# Patient Record
Sex: MALE | Race: WHITE | Employment: FULL TIME | URBAN - METROPOLITAN AREA
[De-identification: names, ages, dates, MRNs, and addresses within clinical notes are randomized per-mention and may not be internally consistent; named-entity substitution may affect disease eponyms.]

---

## 2017-05-11 ENCOUNTER — OFFICE VISIT (OUTPATIENT)
Dept: FAMILY MEDICINE | Facility: CLINIC | Age: 22
End: 2017-05-11
Payer: COMMERCIAL

## 2017-05-11 ENCOUNTER — RADIANT APPOINTMENT (OUTPATIENT)
Dept: GENERAL RADIOLOGY | Facility: CLINIC | Age: 22
End: 2017-05-11
Attending: FAMILY MEDICINE
Payer: COMMERCIAL

## 2017-05-11 VITALS
BODY MASS INDEX: 27.09 KG/M2 | WEIGHT: 211 LBS | DIASTOLIC BLOOD PRESSURE: 78 MMHG | TEMPERATURE: 98 F | HEART RATE: 46 BPM | OXYGEN SATURATION: 98 % | SYSTOLIC BLOOD PRESSURE: 128 MMHG

## 2017-05-11 DIAGNOSIS — S43.431A SUPERIOR GLENOID LABRUM LESION OF RIGHT SHOULDER, INITIAL ENCOUNTER: Primary | ICD-10-CM

## 2017-05-11 DIAGNOSIS — S43.431A SUPERIOR GLENOID LABRUM LESION OF RIGHT SHOULDER, INITIAL ENCOUNTER: ICD-10-CM

## 2017-05-11 PROCEDURE — 73030 X-RAY EXAM OF SHOULDER: CPT | Mod: RT

## 2017-05-11 PROCEDURE — 99213 OFFICE O/P EST LOW 20 MIN: CPT | Performed by: FAMILY MEDICINE

## 2017-05-11 RX ORDER — IBUPROFEN 200 MG
1000 TABLET ORAL DAILY
COMMUNITY
End: 2021-02-05

## 2017-05-11 NOTE — NURSING NOTE
"Chief Complaint   Patient presents with     Shoulder Injury     Right  -  Last Monday fell while playing soccer       Initial /78  Pulse (!) 46  Temp 98  F (36.7  C) (Oral)  Wt 211 lb (95.7 kg)  SpO2 98%  BMI 27.09 kg/m2 Estimated body mass index is 27.09 kg/(m^2) as calculated from the following:    Height as of 10/26/16: 6' 2\" (1.88 m).    Weight as of this encounter: 211 lb (95.7 kg).  Medication Reconciliation: complete   Herberth MICHELE      "

## 2017-05-11 NOTE — PROGRESS NOTES
SUBJECTIVE:                                                    Raj Smith is a 21 year old male who presents to clinic today for the following health issues:    Right Shoulder injury -  Last Monday fell while playing soccer  Trouble sleeping    Fell directly on the shoulder   Patient had a previous shoulder injury   He had a shoulder dislocation and it was immediately put back in place   This was 2 years ago    O: /78  Pulse (!) 46  Temp 98  F (36.7  C) (Oral)  Wt 211 lb (95.7 kg)  SpO2 98%  BMI 27.09 kg/m2    Patient is in [place]     He has pain over the bursa of the right shoulder   He can abduct with pain to 90 degrees and then he has so much pain he he has to stop   He has pain at the ac joint, but less so than the bursa     Xray of the shoulder appears normal     Joint is stable, but really pain in all motions       ICD-10-CM    1. Superior glenoid labrum lesion of right shoulder, initial encounter S43.431A XR Shoulder Right G/E 3 Views     I suspect patient has a traumatic bursitis of the right shoulder   Primary treatment is ice and ibuprofen and gradual increase of ROM as tolerated   If not better in two weeks then rtc.

## 2017-05-11 NOTE — LETTER
[facility]  [address] [place]  [address]  [phone]        May 12, 2017    Raj Smith  [address]  [facility] [address]        Dear Raj,    Your xray of your shoulder was normal     Results for orders placed or performed in visit on 05/11/17   XR Shoulder Right G/E 3 Views    Narrative    XR SHOULDER RT G/E 3 VW 5/11/2017 4:38 PM    COMPARISON: None.    HISTORY: Superior glenoid labral lesion.      Impression    IMPRESSION: No fracture or dislocation identified in the right  shoulder. No significant soft tissue swelling. Joint spaces are  preserved.    BLOSSOM MCKEON       If you have any questions please call the clinic at [phone].    Sincerely,    MD MORENITA Gallo

## 2017-05-11 NOTE — MR AVS SNAPSHOT
After Visit Summary   5/11/2017    Raj Smith    MRN: [id number]           Patient Information     Date Of Birth          1995        Visit Information        Provider Department      5/11/2017 3:40 PM Luis Martin MD [facility]        Today's Diagnoses     Superior glenoid labrum lesion of right shoulder, initial encounter    -  1      Care Instructions      What is bursitis?  A bursa is a fluid-filled sac that helps cushion the muscles, tendons, and bones around a joint. When a bursa becomes inflamed, it s called bursitis. Common symptoms of bursitis include pain, tenderness, and swelling that limits movement of the joint.  What causes bursitis?  Bursitis is most often caused by overuse of a joint. The repeated movements irritate the bursa and may cause it to swell. When that happens, other surrounding tissues may become inflamed or have less space to move. Bursitis is most common in large joints such as the knee, shoulder, and hip.       Nonsurgical treatment involves both rest and exercise.    How is bursitis treated?  To help reduce pain and swelling, your healthcare provider may recommend one or more of the following:     Rest gives the bursa time to heal. This means limiting activities that put stress on the joint.    Anti-inflammatory medications help reduce painful swelling. In some cases, this can include injections of cortisone or other steroid medicines into the bursa.    Splints and support bandages improve your comfort and allow the bursa to heal.    Physical therapy may be used to increase flexibility and strengthen muscles that support the joint.    Aspiration removes extra fluid from the bursa using a needle. This can help your healthcare provider find out what is causing your bursitis. For example, it might be an infection or overuse.     Surgery can be used to remove an inflamed or infected bursa. This is rarely needed.    3094-8049 [employer]. [address] [place] [address]. All rights reserved. This information is not intended as a substitute for professional medical care. Always follow your healthcare professional's instructions.        What is bursitis?  A bursa is a fluid-filled sac that helps cushion the muscles, tendons, and bones around a joint. When a bursa becomes inflamed, it s called bursitis. Common symptoms of bursitis include pain, tenderness, and swelling that limits movement of the joint.  What causes bursitis?  Bursitis is most often caused by overuse of a joint. The repeated movements irritate the bursa and may cause it to swell. When that happens, other surrounding tissues may become inflamed or have less space to move. Bursitis is most common in large joints such as the knee, shoulder, and hip.       Nonsurgical treatment involves both rest and exercise.    How is bursitis treated?  To help reduce pain and swelling, your healthcare provider may recommend one or more of the following:     Rest gives the bursa time to heal. This means limiting activities that put stress on the joint.    Anti-inflammatory medications help reduce painful swelling. In some cases, this can include injections of cortisone or other steroid medicines into the bursa.    Splints and support bandages improve your comfort and allow the bursa to heal.    Physical therapy may be used to increase flexibility and strengthen muscles that support the joint.    Aspiration removes extra fluid from the bursa using a needle. This can help your healthcare provider find out what is causing your bursitis. For example, it might be an infection or overuse.     Surgery can be used to remove an inflamed or infected bursa. This is rarely needed.    1442-0094 [employer]. [address] [place] [address]. All rights reserved. This information is not intended as a substitute for professional medical care. Always follow your  healthcare professional's instructions.        Bursitis  You have bursitis. This is an inflammation of the bursa. These are small, fluid-filled sacs that surround the larger joints of the body. The bursa help the muscles and tendons move smoothly over the joints.  Bursitis often happens in the shoulder. But it can also affect the elbows, hips, pelvis, knees, toes, and heels. Bursitis can be caused by injury, overuse of the joint, or infection of the bursa. Symptoms include pain and tenderness over a joint. Symptoms get worse with movement.  Bursitis is treated with an anti-inflammatory medicine and by resting the joint. More severe cases require injection of medicine directly into the bursa.    Home care    Rest the painful joint and protect it from movement. This will allow the inflammation to heal faster.    Apply an ice pack over the injured area for no more than 15 to 20 minutes. Do this every 3 to 6 hours for the first 24 to 48 hours. Keep using ice packs 3 to 4 times a day until the pain and swelling improves.     To make an ice pack, put ice cubes in a sealed plastic zip-lock bag. Wrap the bag in a clean, thin towel or cloth. Never put ice or an ice pack directly on the skin. As the ice melts, be careful to avoid getting any wrap or splint wet.    You may take over-the-counter pain medicine to treat pain and inflammation, unless another medicine was prescribed. Anti-inflammatory pain medicines may be more effective. Talk with your provider beforeusing these medicines if you have chronic liver or kidney disease, or ever had a stomach ulcer or GI (gastrointestinal) bleeding.    As your symptoms improve, slowly begin to move the joint. Do not overuse the joint. This may cause the symptoms to flare up again.  When to seek medical advice  Call your healthcare provider right away if any of these occur:    Redness over the painful area    Increasing pain or swelling at the joint    Fever of 100.4 F (38 C) or above  "lasting for 24 to 48 hours    6176-6585 [employer]. [address] [place] [address], [place] [address]. All rights reserved. This information is not intended as a substitute for professional medical care. Always follow your healthcare professional's instructions.              Follow-ups after your visit        Who to contact     If you have questions or need follow up information about today's clinic visit or your schedule please contact [facility] directly at [phone].  Normal or non-critical lab and imaging results will be communicated to you by MyChart, letter or phone within 4 business days after the clinic has received the results. If you do not hear from us within 7 days, please contact the clinic through [employer] or phone. If you have a critical or abnormal lab result, we will notify you by phone as soon as possible.  Submit refill requests through [employer] or call your pharmacy and they will forward the refill request to us. Please allow 3 business days for your refill to be completed.          Additional Information About Your Visit        [facility] Information     [employer] lets you send messages to your doctor, view your test results, renew your prescriptions, schedule appointments and more. To sign up, go to [email or web address] . Click on \"Log in\" on the left side of the screen, which will take you to the Welcome page. Then click on \"Sign up Now\" on the right side of the page.     You will be asked to enter the access code listed below, as well as some personal information. Please follow the directions to create your username and password.     Your access code is: [email or web address]  Expires: 2017  4:49 PM     Your access code will  in 90 days. If you need help or a new code, please call your [facility] or [phone].        Care EveryWhere ID     This is your Care EveryWhere ID. This could be used by other organizations to access your [facility] medical " records  [id number]        Your Vitals Were     Pulse Temperature Pulse Oximetry BMI (Body Mass Index)          46 98  F (36.7  C) (Oral) 98% 27.09 kg/m2         Blood Pressure from Last 3 Encounters:   05/11/17 128/78   10/26/16 120/70   09/27/16 116/74    Weight from Last 3 Encounters:   05/11/17 211 lb (95.7 kg)   10/26/16 209 lb (94.8 kg)   09/27/16 201 lb 2 oz (91.2 kg)                 Today's Medication Changes          These changes are accurate as of: 5/11/17  4:53 PM.  If you have any questions, ask your nurse or doctor.               Stop taking these medicines if you haven't already. Please contact your care team if you have questions.     amoxicillin-clavulanate 875-125 MG per tablet   Commonly known as:  AUGMENTIN   Stopped by:  Luis Martin MD           fluticasone 50 MCG/ACT spray   Commonly known as:  FLONASE   Stopped by:  Luis Martin MD           omeprazole 20 MG CR capsule   Commonly known as:  priLOSEC   Stopped by:  Luis Martin MD                    Primary Care Provider    None Specified       No primary provider on file.        Thank you!     Thank you for choosing [facility]  for your care. Our goal is always to provide you with excellent care. Hearing back from our patients is one way we can continue to improve our services. Please take a few minutes to complete the written survey that you may receive in the mail after your visit with us. Thank you!             Your Updated Medication List - Protect others around you: Learn how to safely use, store and throw away your medicines at www.disposemymeds.org.          This list is accurate as of: 5/11/17  4:53 PM.  Always use your most recent med list.                   Brand Name Dispense Instructions for use    ibuprofen 200 MG tablet    ADVIL/MOTRIN     Take 1,000 mg by mouth daily

## 2017-05-11 NOTE — PATIENT INSTRUCTIONS
What is bursitis?  A bursa is a fluid-filled sac that helps cushion the muscles, tendons, and bones around a joint. When a bursa becomes inflamed, it s called bursitis. Common symptoms of bursitis include pain, tenderness, and swelling that limits movement of the joint.  What causes bursitis?  Bursitis is most often caused by overuse of a joint. The repeated movements irritate the bursa and may cause it to swell. When that happens, other surrounding tissues may become inflamed or have less space to move. Bursitis is most common in large joints such as the knee, shoulder, and hip.       Nonsurgical treatment involves both rest and exercise.    How is bursitis treated?  To help reduce pain and swelling, your healthcare provider may recommend one or more of the following:     Rest gives the bursa time to heal. This means limiting activities that put stress on the joint.    Anti-inflammatory medications help reduce painful swelling. In some cases, this can include injections of cortisone or other steroid medicines into the bursa.    Splints and support bandages improve your comfort and allow the bursa to heal.    Physical therapy may be used to increase flexibility and strengthen muscles that support the joint.    Aspiration removes extra fluid from the bursa using a needle. This can help your healthcare provider find out what is causing your bursitis. For example, it might be an infection or overuse.     Surgery can be used to remove an inflamed or infected bursa. This is rarely needed.    4781-6314 [employer]. [address] [place] [address], [place] [address]. All rights reserved. This information is not intended as a substitute for professional medical care. Always follow your healthcare professional's instructions.        What is bursitis?  A bursa is a fluid-filled sac that helps cushion the muscles, tendons, and bones around a joint. When a bursa becomes inflamed, it s called bursitis. Common symptoms of  bursitis include pain, tenderness, and swelling that limits movement of the joint.  What causes bursitis?  Bursitis is most often caused by overuse of a joint. The repeated movements irritate the bursa and may cause it to swell. When that happens, other surrounding tissues may become inflamed or have less space to move. Bursitis is most common in large joints such as the knee, shoulder, and hip.       Nonsurgical treatment involves both rest and exercise.    How is bursitis treated?  To help reduce pain and swelling, your healthcare provider may recommend one or more of the following:     Rest gives the bursa time to heal. This means limiting activities that put stress on the joint.    Anti-inflammatory medications help reduce painful swelling. In some cases, this can include injections of cortisone or other steroid medicines into the bursa.    Splints and support bandages improve your comfort and allow the bursa to heal.    Physical therapy may be used to increase flexibility and strengthen muscles that support the joint.    Aspiration removes extra fluid from the bursa using a needle. This can help your healthcare provider find out what is causing your bursitis. For example, it might be an infection or overuse.     Surgery can be used to remove an inflamed or infected bursa. This is rarely needed.    6800-7223 [employer]. [address] [place] [address], [place] [address]. All rights reserved. This information is not intended as a substitute for professional medical care. Always follow your healthcare professional's instructions.        Bursitis  You have bursitis. This is an inflammation of the bursa. These are small, fluid-filled sacs that surround the larger joints of the body. The bursa help the muscles and tendons move smoothly over the joints.  Bursitis often happens in the shoulder. But it can also affect the elbows, hips, pelvis, knees, toes, and heels. Bursitis can be caused by injury, overuse of  the joint, or infection of the bursa. Symptoms include pain and tenderness over a joint. Symptoms get worse with movement.  Bursitis is treated with an anti-inflammatory medicine and by resting the joint. More severe cases require injection of medicine directly into the bursa.    Home care    Rest the painful joint and protect it from movement. This will allow the inflammation to heal faster.    Apply an ice pack over the injured area for no more than 15 to 20 minutes. Do this every 3 to 6 hours for the first 24 to 48 hours. Keep using ice packs 3 to 4 times a day until the pain and swelling improves.     To make an ice pack, put ice cubes in a sealed plastic zip-lock bag. Wrap the bag in a clean, thin towel or cloth. Never put ice or an ice pack directly on the skin. As the ice melts, be careful to avoid getting any wrap or splint wet.    You may take over-the-counter pain medicine to treat pain and inflammation, unless another medicine was prescribed. Anti-inflammatory pain medicines may be more effective. Talk with your provider beforeusing these medicines if you have chronic liver or kidney disease, or ever had a stomach ulcer or GI (gastrointestinal) bleeding.    As your symptoms improve, slowly begin to move the joint. Do not overuse the joint. This may cause the symptoms to flare up again.  When to seek medical advice  Call your healthcare provider right away if any of these occur:    Redness over the painful area    Increasing pain or swelling at the joint    Fever of 100.4 F (38 C) or above lasting for 24 to 48 hours    9637-6902 [employer]. [address] [place] [address], [place] [address]. All rights reserved. This information is not intended as a substitute for professional medical care. Always follow your healthcare professional's instructions.

## 2018-10-03 ENCOUNTER — OFFICE VISIT (OUTPATIENT)
Dept: FAMILY MEDICINE | Facility: CLINIC | Age: 23
End: 2018-10-03
Payer: COMMERCIAL

## 2018-10-03 VITALS — DIASTOLIC BLOOD PRESSURE: 72 MMHG | TEMPERATURE: 98.1 F | SYSTOLIC BLOOD PRESSURE: 115 MMHG

## 2018-10-03 DIAGNOSIS — Z71.84 TRAVEL ADVICE ENCOUNTER: Primary | ICD-10-CM

## 2018-10-03 DIAGNOSIS — Z23 NEED FOR VACCINATION: ICD-10-CM

## 2018-10-03 PROCEDURE — 90738 INACTIVATED JE VACC IM: CPT | Mod: GA | Performed by: NURSE PRACTITIONER

## 2018-10-03 PROCEDURE — 90746 HEPB VACCINE 3 DOSE ADULT IM: CPT | Mod: GA | Performed by: NURSE PRACTITIONER

## 2018-10-03 PROCEDURE — 90675 RABIES VACCINE IM: CPT | Mod: GA | Performed by: NURSE PRACTITIONER

## 2018-10-03 PROCEDURE — 90472 IMMUNIZATION ADMIN EACH ADD: CPT | Mod: GA | Performed by: NURSE PRACTITIONER

## 2018-10-03 PROCEDURE — 99402 PREV MED CNSL INDIV APPRX 30: CPT | Mod: 25 | Performed by: NURSE PRACTITIONER

## 2018-10-03 PROCEDURE — 90471 IMMUNIZATION ADMIN: CPT | Mod: GA | Performed by: NURSE PRACTITIONER

## 2018-10-03 PROCEDURE — 90632 HEPA VACCINE ADULT IM: CPT | Mod: GA | Performed by: NURSE PRACTITIONER

## 2018-10-03 RX ORDER — AZITHROMYCIN 500 MG/1
500 TABLET, FILM COATED ORAL DAILY
Qty: 6 TABLET | Refills: 0 | Status: SHIPPED | OUTPATIENT
Start: 2018-10-03 | End: 2018-10-06

## 2018-10-03 RX ORDER — ATOVAQUONE AND PROGUANIL HYDROCHLORIDE 250; 100 MG/1; MG/1
1 TABLET, FILM COATED ORAL DAILY
Qty: 50 TABLET | Refills: 0 | Status: SHIPPED | OUTPATIENT
Start: 2018-10-03 | End: 2021-02-05

## 2018-10-03 NOTE — MR AVS SNAPSHOT
After Visit Summary   10/3/2018    Raj Smith    MRN: [id number]           Patient Information     Date Of Birth          1995        Visit Information        Provider Department      10/3/2018 4:00 PM Eliana Celaya APRN CNP [facility]        Today's Diagnoses     Travel advice encounter    -  1    Need for vaccination          Care Instructions    Today October 3, 2018 you received the    Rabies Vaccine - Please return on 10/10/18 for your 2nd dose and 10/24/2018 for your 3rd and final dose.    Hepatitis A Vaccine -  Hepatitis B Vaccine -    Japanese Encephalitis (JE) Vaccine - Please return on 10/10/2018  for your 2nd and final dose.    10/10/2018  JE  Rabies  Typhoid  Flu    10/24*2018  Rabies  Tdap      .    These appointments can be made as a NURSE ONLY visit.    **It is very important for the vaccinations to be given on the scheduled day(s), this helps ensure you receive the full effectiveness of the vaccine.**    Please call [facility] with any questions [phone]    Thank you for visiting [facility]            Follow-ups after your visit        Future tests that were ordered for you today     Open Standing Orders        Priority Remaining Interval Expires Ordered    C IMOVAX RABIES VACCINE, IM Routine 2/3  10/3/2019 10/3/2018          Open Future Orders        Priority Expected Expires Ordered    TYPHOID VACCINE, IM Routine 10/10/2018 10/3/2019 10/3/2018    FLU VAC PRESRV FREE QUAD SPLIT VIR, IM (3+ YRS) Routine 10/10/2018 10/3/2019 10/3/2018    TDAP, IM (10 - 64 YRS) - Adacel Routine 10/24/2018 10/3/2019 10/3/2018            Who to contact     If you have questions or need follow up information about today's clinic visit or your schedule please contact [facility] directly at [phone].  Normal or non-critical lab and imaging results will be communicated to you by MyChart, letter or phone within 4 business  "days after the clinic has received the results. If you do not hear from us within 7 days, please contact the clinic through SimpleSite or phone. If you have a critical or abnormal lab result, we will notify you by phone as soon as possible.  Submit refill requests through SimpleSite or call your pharmacy and they will forward the refill request to us. Please allow 3 business days for your refill to be completed.          Additional Information About Your Visit        SimpleSite Information     SimpleSite lets you send messages to your doctor, view your test results, renew your prescriptions, schedule appointments and more. To sign up, go to [email or web address] . Click on \"Log in\" on the left side of the screen, which will take you to the Welcome page. Then click on \"Sign up Now\" on the right side of the page.     You will be asked to enter the access code listed below, as well as some personal information. Please follow the directions to create your username and password.     Your access code is: [email or web address]  Expires: 2019  4:55 PM     Your access code will  in 90 days. If you need help or a new code, please call your [facility] clinic or [phone].        Care EveryWhere ID     This is your Care EveryWhere ID. This could be used by other organizations to access your [facility] medical records  [id number]        Your Vitals Were     Temperature                   98.1  F (36.7  C) (Oral)            Blood Pressure from Last 3 Encounters:   10/03/18 115/72   17 128/78   10/26/16 120/70    Weight from Last 3 Encounters:   17 211 lb (95.7 kg)   10/26/16 209 lb (94.8 kg)   16 201 lb 2 oz (91.2 kg)              We Performed the Following     HEPA VACCINE ADULT IM     HEPATITIS B VACCINE,ADULT,IM     Greenlandic ENCEPHALITIS IM 16 YO +          Today's Medication Changes          These changes are accurate as of 10/3/18  4:55 PM.  If you have any questions, ask your nurse or doctor.               Start " taking these medicines.        Dose/Directions    atovaquone-proguanil 250-100 MG per tablet   Commonly known as:  MALARONE   Used for:  Travel advice encounter   Started by:  Eliana Celaya APRN CNP        Dose:  1 tablet   Take 1 tablet by mouth daily Start 2 days before exposure to Malaria and continue daily till  7 days after exposure.   Quantity:  50 tablet   Refills:  0       azithromycin 500 MG tablet   Commonly known as:  ZITHROMAX   Used for:  Travel advice encounter   Started by:  Eliana Celaya APRN CNP        Dose:  500 mg   Take 1 tablet (500 mg) by mouth daily for 3 doses Take 1 tablet a day for up to 3 days for severe diarrhea   Quantity:  6 tablet   Refills:  0            Where to get your medicines      These medications were sent to [facility] [address] [place] [address] Rd.  [address] [facility] [address]     Phone:  [phone]     atovaquone-proguanil 250-100 MG per tablet    azithromycin 500 MG tablet                Primary Care Provider Office Phone # Fax #    [facility] [phone]       [address]  [facility] [address]        Equal Access to Services     [facility] Center[facility]OLINA AH: Hadii aad ku hadasho Soomaali, waaxda luqadaha, qaybta kaalmada adeegyada, waxay idiin hayjo annn jolene parada. So [facility] [phone].    ATENCIÓN: Si habla español, tiene a jean disposición servicios gratuitos de asistencia lingüística. Llame al [phone].    We comply with applicable federal civil rights laws and [place] laws. We do not discriminate on the basis of race, color, national origin, age, disability, sex, sexual orientation, or gender identity.            Thank you!     Thank you for choosing [facility]  for your care. Our goal is always to provide you with excellent care. Hearing back from our patients is one way we can continue to improve our services. Please take a few minutes to complete the written  survey that you may receive in the mail after your visit with us. Thank you!             Your Updated Medication List - Protect others around you: Learn how to safely use, store and throw away your medicines at www.disposemymeds.org.          This list is accurate as of 10/3/18  4:55 PM.  Always use your most recent med list.                   Brand Name Dispense Instructions for use Diagnosis    atovaquone-proguanil 250-100 MG per tablet    MALARONE    50 tablet    Take 1 tablet by mouth daily Start 2 days before exposure to Malaria and continue daily till  7 days after exposure.    Travel advice encounter       azithromycin 500 MG tablet    ZITHROMAX    6 tablet    Take 1 tablet (500 mg) by mouth daily for 3 doses Take 1 tablet a day for up to 3 days for severe diarrhea    Travel advice encounter       ibuprofen 200 MG tablet    ADVIL/MOTRIN     Take 1,000 mg by mouth daily

## 2018-10-03 NOTE — PATIENT INSTRUCTIONS
Today October 3, 2018 you received the    Rabies Vaccine - Please return on 10/10/18 for your 2nd dose and 10/24/2018 for your 3rd and final dose.    Hepatitis A Vaccine -  Hepatitis B Vaccine -    Japanese Encephalitis (JE) Vaccine - Please return on 10/10/2018  for your 2nd and final dose.    10/10/2018  JE  Rabies  Typhoid  Flu    10/24*2018  Rabies  Tdap      .    These appointments can be made as a NURSE ONLY visit.    **It is very important for the vaccinations to be given on the scheduled day(s), this helps ensure you receive the full effectiveness of the vaccine.**    Please call [facility] with any questions [phone]    Thank you for visiting [facility]

## 2018-10-03 NOTE — PROGRESS NOTES
Nurse Note      Itinerary:  [place], [place], [place], [place], [place], [place], [place], [place]      Departure Date: 10/29/2018      Return Date: 03/01/2019      Length of Trip 4 months      Reason for Travel: pleasure           Urban or rural: both      Accommodations: Hotel    Hostel    Family home        IMMUNIZATION HISTORY  Have you received any immunizations within the past 4 weeks?  No  Have you ever fainted from having your blood drawn or from an injection?  No  Have you ever had a fever reaction to vaccination?  No  Have you ever had any bad reaction or side effect from any vaccination?  no  Have you ever had hepatitis A or B vaccine?  Yes  Do you live (or work closely) with anyone who has AIDS, an AIDS-like condition, any other immune disorder or who is on chemotherapy for cancer?  No  Do you have a family history of immunodeficiency?  No  Have you received any injection of immune globulin or any blood products during the past 12 months?  No    Patient roomed by SAURABH Ely  Raj Smith is a 23 year old male seen today with spouse for counsultation for international travel to multiple (above) countries.  Patient will be departing in  3 week(s) and staying for   4 month(s) and  traveling with spouse.      Patient itinerary :  will be in the [place] ( 3-4 ) [place] or [place] ( [facility] t[facility]  [facility])    region and with an open itinerary which presents risk for Malaria, Dengue Fever, Chikungungya, Zika, food borne illnesses and Typhoid. exposure.      Patient's activities will include hiking, scuba diving and snorkeling.    Patient[place] country of birth is [place]      Special medical concerns: none  Pre-travel questionnaire was completed by patient and reviewed by provider.     Vitals: /72  Temp 98.1  F (36.7  C) (Oral)  BMI= There is no height or weight on file to calculate BMI.    EXAM:  General:  Well-nourished, well-developed in no acute distress.  Appears to be  stated age, interacts appropriately and expresses understanding of information given to patient.    Current Outpatient Prescriptions   Medication Sig Dispense Refill     atovaquone-proguanil (MALARONE) 250-100 MG per tablet Take 1 tablet by mouth daily Start 2 days before exposure to Malaria and continue daily till  7 days after exposure. 50 tablet 0     ibuprofen (ADVIL/MOTRIN) 200 MG tablet Take 1,000 mg by mouth daily       There is no problem list on file for this patient.    No Known Allergies      Immunizations discussed include:   Hepatitis A:  Ordered/given today, risks, benefits and side effects reviewed  Hepatitis B: Ordered/given today, risks, benefits and side effects reviewed  Influenza: future order placed  Typhoid: future order  Rabies: Ordered/given today, risks, benefits and side effects reviewed  Yellow Fever: Not indicated  Bermudian Encephalitis: Ordered/given today - side effects, precautions, allergies, risks discussed. Patient expressed understanding.  Meningococcus: Up to date  Tetanus/Diphtheria: future order placed  Measles/Mumps/Rubella: Up to date  Cholera: Not needed  Polio: Up to date  Pneumococcal: Under age of 65  Varicella: Immune by disease history per patient report  Zostavax:  Not indicated  Shingrix: Not indicated  HPV:  Not indicated  TB:  Low risk    Altitude Exposure on this trip: no   Past tolerance to Altitude: na    ASSESSMENT/PLAN:    ICD-10-CM    1. Travel advice encounter Z71.89 HEPA VACCINE ADULT IM     TYPHOID VACCINE, IM     FLU VAC PRESRV FREE QUAD SPLIT VIR, IM (3+ YRS)     HEPATITIS B VACCINE,ADULT,IM     C IMOVAX RABIES VACCINE, IM     TDAP, IM (10 - 64 YRS) - Adacel     JAPANESE ENCEPHALITIS IM 18 YO +     C IMOVAX RABIES VACCINE, IM     atovaquone-proguanil (MALARONE) 250-100 MG per tablet     azithromycin (ZITHROMAX) 500 MG tablet     ADMIN 1st VACCINE     EA ADD'L VACCINE   2. Need for vaccination Z23 HEPA VACCINE ADULT IM     TYPHOID VACCINE, IM     FLU VAC  PRESRV FREE QUAD SPLIT VIR, IM (3+ YRS)     HEPATITIS B VACCINE,ADULT,IM     C IMOVAX RABIES VACCINE, IM     TDAP, IM (10 - 64 YRS) - Adacel     C IMOVAX RABIES VACCINE, IM     ADMIN 1st VACCINE     EA ADD'L VACCINE     I have reviewed general recommendations for safe travel   including: food/water precautions, insect precautions, safer sex   practices given high prevalence of Zika, HIV and other STDs,   roadway safety. Educational materials and [facility] report provided.    Malaraia prophylaxis recommended: Malarone  Symptomatic treatment for traveler's diarrhea: azithromycin  Altitude illness prevention and treatment: none      Evacuation insurance advised and resources were provided to patient.    Total visit time 30 minutes  with over 50% of time spent counseling patient as detailed above.    Eliana Celaya CNP

## 2018-10-10 ENCOUNTER — ALLIED HEALTH/NURSE VISIT (OUTPATIENT)
Dept: NURSING | Facility: CLINIC | Age: 23
End: 2018-10-10
Payer: COMMERCIAL

## 2018-10-10 VITALS — TEMPERATURE: 98.8 F

## 2018-10-10 DIAGNOSIS — Z23 NEED FOR VACCINATION: ICD-10-CM

## 2018-10-10 DIAGNOSIS — Z71.84 TRAVEL ADVICE ENCOUNTER: ICD-10-CM

## 2018-10-10 PROCEDURE — 90738 INACTIVATED JE VACC IM: CPT | Mod: GA

## 2018-10-10 PROCEDURE — 90691 TYPHOID VACCINE IM: CPT | Mod: GA

## 2018-10-10 PROCEDURE — 90686 IIV4 VACC NO PRSV 0.5 ML IM: CPT | Mod: GA

## 2018-10-10 PROCEDURE — 90675 RABIES VACCINE IM: CPT | Mod: GA

## 2018-10-10 PROCEDURE — 90472 IMMUNIZATION ADMIN EACH ADD: CPT | Mod: GA

## 2018-10-10 PROCEDURE — 90471 IMMUNIZATION ADMIN: CPT | Mod: GA

## 2018-10-10 PROCEDURE — 99207 ZZC NO CHARGE NURSE ONLY: CPT

## 2018-10-24 ENCOUNTER — ALLIED HEALTH/NURSE VISIT (OUTPATIENT)
Dept: NURSING | Facility: CLINIC | Age: 23
End: 2018-10-24
Payer: COMMERCIAL

## 2018-10-24 DIAGNOSIS — Z23 NEED FOR VACCINATION: ICD-10-CM

## 2018-10-24 DIAGNOSIS — Z71.84 TRAVEL ADVICE ENCOUNTER: ICD-10-CM

## 2018-10-24 PROCEDURE — 90675 RABIES VACCINE IM: CPT | Mod: GA

## 2018-10-24 PROCEDURE — 99207 ZZC NO CHARGE NURSE ONLY: CPT

## 2018-10-24 PROCEDURE — 90715 TDAP VACCINE 7 YRS/> IM: CPT | Mod: GA

## 2018-10-24 PROCEDURE — 90471 IMMUNIZATION ADMIN: CPT | Mod: GA

## 2018-10-24 PROCEDURE — 90472 IMMUNIZATION ADMIN EACH ADD: CPT | Mod: GA

## 2021-02-05 ENCOUNTER — OFFICE VISIT (OUTPATIENT)
Dept: FAMILY MEDICINE | Facility: CLINIC | Age: 26
End: 2021-02-05
Payer: COMMERCIAL

## 2021-02-05 VITALS
BODY MASS INDEX: 25.41 KG/M2 | WEIGHT: 198 LBS | SYSTOLIC BLOOD PRESSURE: 118 MMHG | HEART RATE: 60 BPM | DIASTOLIC BLOOD PRESSURE: 78 MMHG | HEIGHT: 74 IN

## 2021-02-05 DIAGNOSIS — R12 HEARTBURN: ICD-10-CM

## 2021-02-05 DIAGNOSIS — S09.90XD INJURY OF HEAD, SUBSEQUENT ENCOUNTER: ICD-10-CM

## 2021-02-05 DIAGNOSIS — S89.92XD INJURY OF LEFT LOWER EXTREMITY, SUBSEQUENT ENCOUNTER: ICD-10-CM

## 2021-02-05 DIAGNOSIS — Z00.00 ROUTINE GENERAL MEDICAL EXAMINATION AT A HEALTH CARE FACILITY: Primary | ICD-10-CM

## 2021-02-05 PROCEDURE — 99213 OFFICE O/P EST LOW 20 MIN: CPT | Mod: 25 | Performed by: NURSE PRACTITIONER

## 2021-02-05 PROCEDURE — 99395 PREV VISIT EST AGE 18-39: CPT | Mod: 25 | Performed by: NURSE PRACTITIONER

## 2021-02-05 PROCEDURE — 90686 IIV4 VACC NO PRSV 0.5 ML IM: CPT | Performed by: NURSE PRACTITIONER

## 2021-02-05 PROCEDURE — 90471 IMMUNIZATION ADMIN: CPT | Performed by: NURSE PRACTITIONER

## 2021-02-05 RX ORDER — MAGNESIUM HYDROXIDE/ALUMINUM HYDROXICE/SIMETHICONE 120; 1200; 1200 MG/30ML; MG/30ML; MG/30ML
30 SUSPENSION ORAL EVERY 4 HOURS PRN
COMMUNITY
End: 2022-03-07 | Stop reason: ALTCHOICE

## 2021-02-05 RX ORDER — DIPHENHYDRAMINE HCL 25 MG
25 TABLET ORAL EVERY 6 HOURS PRN
COMMUNITY
End: 2023-02-16 | Stop reason: SINTOL

## 2021-02-05 ASSESSMENT — ENCOUNTER SYMPTOMS
ABDOMINAL PAIN: 0
SHORTNESS OF BREATH: 0
CHILLS: 0
CONSTIPATION: 0
DYSURIA: 0
HEMATURIA: 0
NAUSEA: 0
PALPITATIONS: 0
SORE THROAT: 0
DIZZINESS: 0
ARTHRALGIAS: 0
HEARTBURN: 0
HEADACHES: 0
MYALGIAS: 0
FEVER: 0
COUGH: 0
FREQUENCY: 0
JOINT SWELLING: 0
PARESTHESIAS: 0
EYE PAIN: 0
NERVOUS/ANXIOUS: 1
HEMATOCHEZIA: 0
WEAKNESS: 0
DIARRHEA: 0

## 2021-02-05 ASSESSMENT — MIFFLIN-ST. JEOR: SCORE: 1946.25

## 2021-02-05 NOTE — PROGRESS NOTES
SUBJECTIVE:   CC: Raj Smith is an 25 year old male who presents for preventative health visit.       Patient has been advised of split billing requirements and indicates understanding: Yes  Healthy Habits:     Getting at least 3 servings of Calcium per day:  Yes    Bi-annual eye exam:  NO    Dental care twice a year:  Yes    Sleep apnea or symptoms of sleep apnea:  None    Diet:  Other    Frequency of exercise:  2-3 days/week    Duration of exercise:  30-45 minutes    Taking medications regularly:  Yes    Medication side effects:  None    PHQ-2 Total Score: 2    Additional concerns today:  No    He states he will be doing biometric screening through work in March. His wife is an RN     Last week he was in a ski accident. He states he twisted his left leg during this injury and hit his head. He was evaluated in the ED and imaging was performed. He states his symptoms are improving however he continues to have some pain to his lateral and medial left upper leg with twisting motions, he Is able to walk okay. Neck pain has resolved. He had fatigue for a few days afterwards. He denies loss of consciousness. He took tylenol and ibuprofen in the beginning. He states screens cause exhaustion, he has had to take frequent breaks from work the first few days. He is working from home. Denies prior concussion. He slept 19 hours the day after the injury. He reports ringing of his right ear a few days ago. Denies vomiting. He reports chronic runny nose in the morning.     He believes he has GERD. He gets heartburn and burning in his throat from chocolate, sugar, alcohol and coffee and it is worse in the evenings. 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 He is taking omeprazole daily but is unsure of the dose. He also takes Mylanta at night. He admits to eating fairly healthy. He also reports hemorrhoids in the past.      He states he is involved in a soccer league and walks regularly.     He admits to being a little down due to the pandemic and not being able to see family/friends. He states he has been doing meditation through work. Declines need for therapist and medications at this time.     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 He states his brother has these too.     ED/UC Followup:    Facility:  [facility],   Date of visit: 01/29/2021  Reason for visit: Skiing accident 01/29  Current Status:        Today's PHQ-2 Score:   PHQ-2 ( 1999 Pfizer) 2/5/2021   Q1: Little interest or pleasure in doing things 1   Q2: Feeling down, depressed or hopeless 1   PHQ-2 Score 2   Q1: Little interest or pleasure in doing things Several days   Q2: Feeling down, depressed or hopeless Several days   PHQ-2 Score 2       Abuse: Current or Past(Physical, Sexual or Emotional)- No  Do you feel safe in your environment? Yes        Social History     Tobacco Use     Smoking status: Never Smoker     Smokeless tobacco: Never Used   Substance Use Topics     Alcohol use: Yes     Comment: 1 beer a day 5-7 drinks per week       Alcohol Use 2/5/2021   Prescreen: >3 drinks/day or >7 drinks/week? Yes   AUDIT SCORE  4     Last PSA: No results found for: PSA    Reviewed orders with patient. Reviewed health maintenance and updated orders accordingly - Yes      Reviewed and updated as needed this visit by clinical staff  Tobacco  Allergies  Meds  Problems  Med Hx  Surg Hx  Fam Hx  Soc Hx          Reviewed and updated as needed this visit by Provider     Problems              Review of Systems   Constitutional: Negative for chills and fever.   HENT: Negative for congestion, ear pain, hearing loss and sore throat.    Eyes: Negative for pain and visual disturbance.   Respiratory: Negative for cough and shortness of breath.    Cardiovascular: Negative for chest pain, palpitations and peripheral edema.  "  Gastrointestinal: Negative for abdominal pain, constipation, diarrhea, heartburn, hematochezia and nausea.   Genitourinary: Negative for discharge, dysuria, frequency, genital sores, hematuria, impotence and urgency.   Musculoskeletal: Negative for arthralgias, joint swelling and myalgias.   Skin: Negative for rash.   Neurological: Negative for dizziness, weakness, headaches and paresthesias.   Psychiatric/Behavioral: Negative for mood changes. The patient is nervous/anxious.        OBJECTIVE:   /78 (Cuff Size: Adult Regular)   Pulse 60   Ht 1.885 m (6' 2.21\")   Wt 89.8 kg (198 lb)   BMI 25.28 kg/m      Physical Exam  GENERAL: healthy, alert and no distress  EYES: Eyes grossly normal to inspection, PERRL and conjunctivae and sclerae normal  HENT: nose and mouth without ulcers or lesions. Clear fluid noted to right TM.   NECK: no adenopathy, no asymmetry, masses, or scars and thyroid normal to palpation. Full ROM of head/neck, tenderness noted to bilat upper trapezius muscle   RESP: lungs clear to auscultation - no rales, rhonchi or wheezes  CV: regular rate and rhythm, normal S1 S2, no S3 or S4, no murmur, click or rub, no peripheral edema and peripheral pulses strong  ABDOMEN: soft, nontender, no hepatosplenomegaly, no masses and bowel sounds normal  MS: gait steady, negative straight raise leg test. Decreased ROM with adduction and abduction of left hip  SKIN: no suspicious lesions or rashes. Lipoma about the size of dime noted to RUQ area   NEURO: Normal strength and tone, mentation intact and speech normal  PSYCH: mentation appears normal, affect normal/bright      ASSESSMENT/PLAN:   1. Routine general medical examination at a health care facility    2. Heartburn  EGD ordered due to chronicity of symptoms. Pt to continue to take omeprazole daily and avoid food triggers   - GASTROENTEROLOGY ADULT REF PROCEDURE ONLY; Future    3. Injury of head, subsequent encounter  Encouraged pt to avoid screen time " "and encouraged rest due to recent head injury. Pt had brain imaging last week and symptoms are improving per report. No red flag symptoms at this time. Handout provided regarding concussion.     4. Leg injury  Encouraged rest, elevation, alternating tylenol and ibuprofen as needed for pain, and ice as needed. Discussed physical therapy however pt plans to do stretches at home. If pain persists may need to obtain MRI.       COUNSELING:   Reviewed preventive health counseling, as reflected in patient instructions    Estimated body mass index is 25.28 kg/m  as calculated from the following:    Height as of this encounter: 1.885 m (6' 2.21\").    Weight as of this encounter: 89.8 kg (198 lb).     Weight management plan: Discussed healthy diet and exercise guidelines    He reports that he has never smoked. He has never used smokeless tobacco.      Counseling Resources:  ATP IV Guidelines  Pooled Cohorts Equation Calculator  FRAX Risk Assessment  ICSI Preventive Guidelines  Dietary Guidelines for Americans, 2010  USDA's MyPlate  ASA Prophylaxis  Lung CA Screening    Nusrat Harding NP  [facility] [place]  "

## 2021-02-05 NOTE — PATIENT INSTRUCTIONS
Preventive Health Recommendations  Male Ages 26 - 39    Yearly exam:             See your health care provider every year in order to  o   Review health changes.   o   Discuss preventive care.    o   Review your medicines if your doctor has prescribed any.    You should be tested each year for STDs (sexually transmitted diseases), if you re at risk.     After age 35, talk to your provider about cholesterol testing. If you are at risk for heart disease, have your cholesterol tested at least every 5 years.     If you are at risk for diabetes, you should have a diabetes test (fasting glucose).  Shots: Get a flu shot each year. Get a tetanus shot every 10 years.     Nutrition:    Eat at least 5 servings of fruits and vegetables daily.     Eat whole-grain bread, whole-wheat pasta and brown rice instead of white grains and rice.     Get adequate Calcium and Vitamin D.     Lifestyle    Exercise for at least 150 minutes a week (30 minutes a day, 5 days a week). This will help you control your weight and prevent disease.     Limit alcohol to one drink per day.     No smoking.     Wear sunscreen to prevent skin cancer.     See your dentist every six months for an exam and cleaning.     Patient Education     Concussion    A concussion is a type of brain injury. It can be caused by a direct hit or blow to the head, neck, face, or body. The force of the blow makes the head and brain shake quickly back and forth. In some cases you may lose consciousness. Depending on the severity of the blow, it will take from a few hours up to a few days to get better. Sometimes symptoms may last a few months or longer. This is called post-concussion syndrome.  At first, you may have a headache, nausea, vomiting, or dizziness. You may also have problems concentrating or remembering things. This is normal.  Symptoms should get better as the hours and days go by. Symptoms that get worse could be a sign of a more serious brain injury. This might  be a bruise or bleeding in the brain. That s why it s important to watch for the warning signs listed below.  School-age children are more at risk for symptoms that don t go away after a concussion. They should be watched very closely.   Home care  If your injury is mild and there are no serious signs or symptoms, your healthcare provider may recommend that you be watched at home. If there is evidence that the injury is more serious, you will be watched in the hospital. Follow these tips to help care for yourself at home:    After a concussion, your healthcare provider may recommend that a family member or friend watched you for 12 to 24 hours. They may be told to wake you every few hours during sleep to check for the signs below.    If your face or scalp swells, apply an ice pack for 20 minutes every 1 to 2 hours. Do this until the swelling starts to go down. To make an ice pack, put ice cubes in a plastic bag that seals at the top. Wrap the bag in a clean, thin towel or cloth. Never put ice or an ice pack directly on the skin.    You may use acetaminophen to control pain, unless another pain medicine was prescribed. Don't use aspirin or ibuprofen after a head injury. If you have long-lasting (chronic) liver or kidney disease, talk with your healthcare provider before using these medicines. Also talk with your provider if you ever had a stomach ulcer or gastrointestinal bleeding.    For the next 24 hours:  ? Don t drink alcohol or take sedatives or medicines that make you sleepy.  ? Don t drive or operate machinery.  ? Don't do anything strenuous. Don t lift or strain.    Don t return right away to sports or to any activity where you could hit your head. Wait until all symptoms are gone and you have been cleared by your healthcare provider. Having a second head injury before you fully recover from the first one can lead to serious brain injury.    After a few days, it s OK to go back to your normal daily  activities. But don t do anything that could cause your head to be hit again.  Follow-up care  Follow up with your healthcare provider in 1 week, or as directed.  A radiologist will review any X-rays or CT scans that were taken. You will be told of any new findings that may affect your care.  When to seek medical advice  Call your healthcare provider right away if any of these occur:    Headache or dizziness that won t go away    Redness, warmth, or pus from the swollen area  Call 911  Call 911 or get medical care right away if any of these occur:    Repeated vomiting (it s common to vomit once after a head injury)    Headache or dizziness that is severe or gets worse    Loss of consciousness    Unusual drowsiness, or unable to wake up as usual    Weakness or decreased ability to walk or move any limb    Confusion, agitation, or change in behavior or speech, or memory loss    Blurred vision    Convulsion (seizure)    Swelling on the scalp or face that gets worse    Changes in pupil size (the black part of the eye)    Fluid draining from or bleeding from the nose or ears  [employer] last reviewed this educational content on 6/1/2018 2000-2020 [employer]. [address] [place] [address]. All rights reserved. This information is not intended as a substitute for professional medical care. Always follow your healthcare professional's instructions.         Patient Education     After a Concussion     Awaken to check alertness as often as the health care provider suggests.   If you had a mild concussion (a head injury), watch closely for signs of problems during the first 48 hours after the injury. Follow the doctor s advice about recovering at home. Use the tips on this handout as a guide.   Note: You should not be left alone after a concussion. If no adult can stay with the injured person, let the doctor know.   Have someone call 911 or your emergency number if you can't fully wake up or have a  seizures or convulsions.   The first 48 hours  Don t take medicine unless approved by your healthcare provider. Try placing a cold, damp cloth on your head to help relieve a headache.     Ask the doctor before using any medicines.    Don't drink alcohol or take sedatives or medicines that make you sleepy.    Don't return to sports or any activity that could cause you to hit your head until all symptoms are gone and your doctor says it's OK. A second head injury before fully recovering from the first one can lead to serious brain injury.    Don't do activities that need a lot of concentration or a lot of attention. This will let your brain rest and heal faster.    Return to regular physical and mental activity as directed with your doctor's OK.  Tips about sleeping  For the first day or two, it may be best not to sleep for long periods of time without being checked for alertness. Follow the doctor s instructions.   ? Have someone wake you every ____ hours for the next ____ hours. He or she should ask you questions to check for alertness.   ? OK to sleep through the night.  When to call the healthcare provider  If you notice any of the following, call the healthcare provider:    Vomiting. Some vomiting is common, but tell the provider about any vomiting.    Clear or bloody drainage from the nose or ear    Constant drowsiness or trouble waking up    Confusion or memory loss    Blurred vision or any vision changes    Inability to walk or talk normally    Increased weakness or problems with coordination    Constant, unrelieved headache that becomes more severe    Changes in behavior or personality    High-pitched crying in infants    Signs of stroke such as paralysis of parts of the body    Uncontrolled movements suggesting a seizure    Loss of bowel or bladder control  [employer] last reviewed this educational content on 3/1/2020    1732-7255 [employer]. [address] [place] [address], [place] [address]. All rights  reserved. This information is not intended as a substitute for professional medical care. Always follow your healthcare professional's instructions.

## 2021-02-06 PROBLEM — S09.90XD INJURY OF HEAD, SUBSEQUENT ENCOUNTER: Status: ACTIVE | Noted: 2021-02-06

## 2021-02-06 PROBLEM — R12 HEARTBURN: Status: ACTIVE | Noted: 2021-02-06

## 2021-02-15 ENCOUNTER — TELEPHONE (OUTPATIENT)
Dept: FAMILY MEDICINE | Facility: CLINIC | Age: 26
End: 2021-02-15

## 2021-02-15 DIAGNOSIS — R19.8 ALTERED BOWEL FUNCTION: Primary | ICD-10-CM

## 2021-02-15 NOTE — TELEPHONE ENCOUNTER
Routing to Nusrat Harding CNP    Patient seen 2/5/21 , patient has his GI appointment scheduled, he is wondering if he can also do a colonoscopy at the same time, would need a referral- pended      Yamilet Harley RN

## 2021-02-15 NOTE — TELEPHONE ENCOUNTER
Patient has a gastro order in place for his upper endoscopy, he would like to have an order added for a colonoscopy as well due to his chronic lower abdominal issues.

## 2021-02-16 NOTE — TELEPHONE ENCOUNTER
Relayed the  below information to patient. He will call GI to see if both can be done at the same appointment time.

## 2021-02-17 DIAGNOSIS — Z11.59 ENCOUNTER FOR SCREENING FOR OTHER VIRAL DISEASES: ICD-10-CM

## 2021-02-24 RX ORDER — BISACODYL 5 MG/1
5 TABLET, DELAYED RELEASE ORAL SEE ADMIN INSTRUCTIONS
Qty: 1 TABLET | Refills: 0 | Status: SHIPPED | OUTPATIENT
Start: 2021-02-24 | End: 2022-03-07 | Stop reason: ALTCHOICE

## 2021-02-24 RX ORDER — SODIUM, POTASSIUM,MAG SULFATES 17.5-3.13G
1 SOLUTION, RECONSTITUTED, ORAL ORAL SEE ADMIN INSTRUCTIONS
Qty: 2 BOTTLE | Refills: 0 | Status: SHIPPED | OUTPATIENT
Start: 2021-02-24 | End: 2022-03-07 | Stop reason: ALTCHOICE

## 2021-02-28 DIAGNOSIS — Z11.59 ENCOUNTER FOR SCREENING FOR OTHER VIRAL DISEASES: ICD-10-CM

## 2021-02-28 LAB
SARS-COV-2 RNA RESP QL NAA+PROBE: NORMAL
SPECIMEN SOURCE: NORMAL

## 2021-02-28 PROCEDURE — U0005 INFEC AGEN DETEC AMPLI PROBE: HCPCS | Performed by: SURGERY

## 2021-02-28 PROCEDURE — 99207 PR NO CHARGE LOS: CPT

## 2021-02-28 PROCEDURE — U0003 INFECTIOUS AGENT DETECTION BY NUCLEIC ACID (DNA OR RNA); SEVERE ACUTE RESPIRATORY SYNDROME CORONAVIRUS 2 (SARS-COV-2) (CORONAVIRUS DISEASE [COVID-19]), AMPLIFIED PROBE TECHNIQUE, MAKING USE OF HIGH THROUGHPUT TECHNOLOGIES AS DESCRIBED BY CMS-2020-01-R: HCPCS | Performed by: SURGERY

## 2021-03-01 LAB
LABORATORY COMMENT REPORT: NORMAL
SARS-COV-2 RNA RESP QL NAA+PROBE: NEGATIVE
SPECIMEN SOURCE: NORMAL

## 2021-03-03 ENCOUNTER — HOSPITAL ENCOUNTER (OUTPATIENT)
Facility: AMBULATORY SURGERY CENTER | Age: 26
Discharge: HOME | End: 2021-03-03
Attending: INTERNAL MEDICINE | Admitting: INTERNAL MEDICINE
Payer: COMMERCIAL

## 2021-03-03 VITALS
HEART RATE: 59 BPM | TEMPERATURE: 97.8 F | RESPIRATION RATE: 16 BRPM | DIASTOLIC BLOOD PRESSURE: 75 MMHG | OXYGEN SATURATION: 98 % | SYSTOLIC BLOOD PRESSURE: 121 MMHG

## 2021-03-03 DIAGNOSIS — Z12.11 SPECIAL SCREENING FOR MALIGNANT NEOPLASMS, COLON: Primary | ICD-10-CM

## 2021-03-03 LAB
COLONOSCOPY: NORMAL
UPPER GI ENDOSCOPY: NORMAL

## 2021-03-03 PROCEDURE — 43239 EGD BIOPSY SINGLE/MULTIPLE: CPT

## 2021-03-03 PROCEDURE — G8907 PT DOC NO EVENTS ON DISCHARG: HCPCS

## 2021-03-03 PROCEDURE — G8918 PT W/O PREOP ORDER IV AB PRO: HCPCS

## 2021-03-03 PROCEDURE — 45380 COLONOSCOPY AND BIOPSY: CPT

## 2021-03-03 PROCEDURE — 88305 TISSUE EXAM BY PATHOLOGIST: CPT | Mod: GC | Performed by: PATHOLOGY

## 2021-03-03 RX ORDER — NALOXONE HYDROCHLORIDE 0.4 MG/ML
0.4 INJECTION, SOLUTION INTRAMUSCULAR; INTRAVENOUS; SUBCUTANEOUS
Status: DISCONTINUED | OUTPATIENT
Start: 2021-03-03 | End: 2021-03-04 | Stop reason: HOSPADM

## 2021-03-03 RX ORDER — LIDOCAINE 40 MG/G
CREAM TOPICAL
Status: DISCONTINUED | OUTPATIENT
Start: 2021-03-03 | End: 2021-03-04 | Stop reason: HOSPADM

## 2021-03-03 RX ORDER — ONDANSETRON 4 MG/1
4 TABLET, ORALLY DISINTEGRATING ORAL EVERY 6 HOURS PRN
Status: DISCONTINUED | OUTPATIENT
Start: 2021-03-03 | End: 2021-03-04 | Stop reason: HOSPADM

## 2021-03-03 RX ORDER — FENTANYL CITRATE 50 UG/ML
INJECTION, SOLUTION INTRAMUSCULAR; INTRAVENOUS PRN
Status: DISCONTINUED | OUTPATIENT
Start: 2021-03-03 | End: 2021-03-03 | Stop reason: HOSPADM

## 2021-03-03 RX ORDER — PROCHLORPERAZINE MALEATE 10 MG
10 TABLET ORAL EVERY 6 HOURS PRN
Status: DISCONTINUED | OUTPATIENT
Start: 2021-03-03 | End: 2021-03-04 | Stop reason: HOSPADM

## 2021-03-03 RX ORDER — NALOXONE HYDROCHLORIDE 0.4 MG/ML
0.2 INJECTION, SOLUTION INTRAMUSCULAR; INTRAVENOUS; SUBCUTANEOUS
Status: DISCONTINUED | OUTPATIENT
Start: 2021-03-03 | End: 2021-03-04 | Stop reason: HOSPADM

## 2021-03-03 RX ORDER — FLUMAZENIL 0.1 MG/ML
0.2 INJECTION, SOLUTION INTRAVENOUS
Status: SHIPPED | OUTPATIENT
Start: 2021-03-03 | End: 2021-03-03

## 2021-03-03 RX ORDER — ONDANSETRON 2 MG/ML
4 INJECTION INTRAMUSCULAR; INTRAVENOUS
Status: DISCONTINUED | OUTPATIENT
Start: 2021-03-03 | End: 2021-03-04 | Stop reason: HOSPADM

## 2021-03-03 RX ORDER — SODIUM CHLORIDE, SODIUM LACTATE, POTASSIUM CHLORIDE, CALCIUM CHLORIDE 600; 310; 30; 20 MG/100ML; MG/100ML; MG/100ML; MG/100ML
INJECTION, SOLUTION INTRAVENOUS CONTINUOUS
Status: DISCONTINUED | OUTPATIENT
Start: 2021-03-03 | End: 2021-03-04 | Stop reason: HOSPADM

## 2021-03-03 RX ORDER — ONDANSETRON 2 MG/ML
4 INJECTION INTRAMUSCULAR; INTRAVENOUS EVERY 6 HOURS PRN
Status: DISCONTINUED | OUTPATIENT
Start: 2021-03-03 | End: 2021-03-04 | Stop reason: HOSPADM

## 2021-03-03 RX ADMIN — SODIUM CHLORIDE, SODIUM LACTATE, POTASSIUM CHLORIDE, CALCIUM CHLORIDE: 600; 310; 30; 20 INJECTION, SOLUTION INTRAVENOUS at 12:04

## 2021-03-05 LAB — COPATH REPORT: NORMAL

## 2021-03-22 ENCOUNTER — MYC MEDICAL ADVICE (OUTPATIENT)
Dept: FAMILY MEDICINE | Facility: CLINIC | Age: 26
End: 2021-03-22

## 2021-03-22 DIAGNOSIS — F45.8 TEETH GRINDING: Primary | ICD-10-CM

## 2021-03-23 NOTE — TELEPHONE ENCOUNTER
Referral placed to ENT per pt request. My chart message sent to pt   
Routing to Nusrat Harding NP as FYI- see Danielle Pedroza RN    
Routing to PCP    Referral pended if willing    Willing to send referral for ENT or would you like to see to discuss?  Patient saw dentist and noted grinding.  Recommended discussing with ENT?      Isaias Delaney, RN, BSN, PHN  [facility]  
noted  
No

## 2021-11-29 NOTE — PROGRESS NOTES
SUBJECTIVE:   Raj Smith is a 26 year old male who is seen as self referral for evaluation of left ring finger injury 3 weeks ago.  History: injured playing basketball x 2 about 3 weeks ago, 1 injury was a fall, another may have been a jamming.  Pain when got home.  Pain x 1 week.  Present symptoms: swelling at PIP. Some pain with full fist. Able to extend   Can't get ring on.    Treatments tried to this point: none    Relevant Orthopedic PMH: reports multiple finger jams in his life    Past Medical History: No past medical history on file.  Past Surgical History:   Past Surgical History:   Procedure Laterality Date     COLONOSCOPY N/A 3/3/2021    Procedure: Colonoscopy, With Polypectomy And Biopsy;  Surgeon: Josemanuel Alexander MD;  Location: [facility] OR     COLONOSCOPY WITH CO2 INSUFFLATION N/A 3/3/2021    Procedure: COLONOSCOPY, WITH CO2 INSUFFLATION;  Surgeon: Josemanuel Alexander MD;  Location: [facility] OR     COMBINED ESOPHAGOSCOPY, GASTROSCOPY, DUODENOSCOPY (EGD) WITH CO2 INSUFFLATION N/A 3/3/2021    Procedure: ESOPHAGOGASTRODUODENOSCOPY, WITH CO2 INSUFFLATION;  Surgeon: Josemanuel Alexander MD;  Location: [facility] OR     ESOPHAGOSCOPY, GASTROSCOPY, DUODENOSCOPY (EGD), COMBINED N/A 3/3/2021    Procedure: Esophagogastroduodenoscopy, With Biopsy;  Surgeon: Josemanuel Alexander MD;  Location: [facility] OR     NO HISTORY OF SURGERY       Family History:   Family History   Problem Relation Age of Onset     Other Cancer Maternal Grandmother         brain tumor     Other Cancer Maternal Grandfather         brain tumor     Diabetes No family hx of      Hypertension No family hx of      Prostate Cancer No family hx of      Colon Cancer No family hx of      Breast Cancer No family hx of      Social History:   Social History     Tobacco Use     Smoking status: Never Smoker     Smokeless tobacco: Never Used   Substance Use Topics     Alcohol use: Yes     Comment: 1 beer a day 5-7 drinks per week       Review  of Systems:  Constitutional:  NEGATIVE for fever, chills, change in weight  Integumentary/Skin:  NEGATIVE for worrisome rashes, moles or lesions  Eyes:  NEGATIVE for vision changes or irritation  ENT/Mouth:  NEGATIVE for ear, mouth and throat problems  Resp:  NEGATIVE for significant cough or SOB  Breast:  NEGATIVE for masses, tenderness or discharge  CV:  NEGATIVE for chest pain, palpitations or peripheral edema  GI:  NEGATIVE for nausea, abdominal pain, heartburn, or change in bowel habits  :  Negative   Musculoskeletal:  See HPI above  Neuro:  NEGATIVE for weakness, dizziness or paresthesias  Endocrine:  NEGATIVE for temperature intolerance, skin/hair changes  Heme/allergy/immune:  NEGATIVE for bleeding problems  Psychiatric:  NEGATIVE for changes in mood or affect    OBJECTIVE:  Physical Exam:  There were no vitals taken for this visit.  General Appearance: healthy, alert and no distress   Skin: no suspicious lesions or rashes  Neuro: Normal strength and tone, mentation intact and speech normal  Vascular: good pulses, and cappillary refill   Lymph: no lymphadenopathy   Psych:  mentation appears normal and affect normal/bright  Resp: no increased work of breathing     Left Hand Exam:  Inspection: a mild boutonierre deformity of the left 4th finger..  Masses: none  ROM: able to make a full fist  Is slightly short of full active PIP extension  DIP lacks some flexion  Moderate swelling of the left 4th PIP  Tenderness: over the PIP   Collateral ligament stability of the 4th PIP: stable, mild pain with varus-valgus testing    X-rays:  None      ASSESSMENT:   Encounter Diagnoses   Name Primary?     Jammed interphalangeal joint of finger of left hand, initial encounter Yes     Finger injury, left, initial encounter     possible early boutonierre    PLAN:   Oval -8 splint to the PIP joint for the next 3 weeks  Work on DIP flexion  Ok to remove splint for hygiene. Suggested taping finger for playing basketball    Explained that a jammed finger can take up to 3 months to get better.  Return to clinic: 3-4 weeks or as needed     LUCIA Reyes MD  Dept. Orthopedic Surgery  [facility]

## 2021-12-01 ENCOUNTER — ANCILLARY PROCEDURE (OUTPATIENT)
Dept: GENERAL RADIOLOGY | Facility: CLINIC | Age: 26
End: 2021-12-01
Attending: ORTHOPAEDIC SURGERY
Payer: COMMERCIAL

## 2021-12-01 ENCOUNTER — OFFICE VISIT (OUTPATIENT)
Dept: ORTHOPEDICS | Facility: CLINIC | Age: 26
End: 2021-12-01
Payer: COMMERCIAL

## 2021-12-01 VITALS — SYSTOLIC BLOOD PRESSURE: 122 MMHG | DIASTOLIC BLOOD PRESSURE: 72 MMHG | OXYGEN SATURATION: 100 % | HEART RATE: 51 BPM

## 2021-12-01 DIAGNOSIS — S69.92XA FINGER INJURY, LEFT, INITIAL ENCOUNTER: ICD-10-CM

## 2021-12-01 DIAGNOSIS — S69.92XA JAMMED INTERPHALANGEAL JOINT OF FINGER OF LEFT HAND, INITIAL ENCOUNTER: Primary | ICD-10-CM

## 2021-12-01 PROCEDURE — 73140 X-RAY EXAM OF FINGER(S): CPT | Mod: LT | Performed by: RADIOLOGY

## 2021-12-01 PROCEDURE — 99203 OFFICE O/P NEW LOW 30 MIN: CPT | Performed by: ORTHOPAEDIC SURGERY

## 2021-12-01 ASSESSMENT — PAIN SCALES - GENERAL: PAINLEVEL: MODERATE PAIN (5)

## 2021-12-01 NOTE — LETTER
12/1/2021         RE: Raj Smith  [address] [facility] [address]        Dear Colleague,    Thank you for referring your patient, Raj Smith, to the [facility]. Please see a copy of my visit note below.    SUBJECTIVE:   Raj Smith is a 26 year old male who is seen as self referral for evaluation of left ring finger injury 3 weeks ago.  History: injured playing basketball x 2 about 3 weeks ago, 1 injury was a fall, another may have been a jamming.  Pain when got home.  Pain x 1 week.  Present symptoms: swelling at PIP. Some pain with full fist. Able to extend   Can't get ring on.    Treatments tried to this point: none    Relevant Orthopedic PMH: reports multiple finger jams in his life    Past Medical History: No past medical history on file.  Past Surgical History:   Past Surgical History:   Procedure Laterality Date     COLONOSCOPY N/A 3/3/2021    Procedure: Colonoscopy, With Polypectomy And Biopsy;  Surgeon: Josemanuel Alexander MD;  Location: [facility]     COLONOSCOPY WITH CO2 INSUFFLATION N/A 3/3/2021    Procedure: COLONOSCOPY, WITH CO2 INSUFFLATION;  Surgeon: Josemanuel Alexander MD;  Location: [facility]     COMBINED ESOPHAGOSCOPY, GASTROSCOPY, DUODENOSCOPY (EGD) WITH CO2 INSUFFLATION N/A 3/3/2021    Procedure: ESOPHAGOGASTRODUODENOSCOPY, WITH CO2 INSUFFLATION;  Surgeon: Josemanuel Alexander MD;  Location: [facility]     ESOPHAGOSCOPY, GASTROSCOPY, DUODENOSCOPY (EGD), COMBINED N/A 3/3/2021    Procedure: Esophagogastroduodenoscopy, With Biopsy;  Surgeon: Josemanuel Alexander MD;  Location: [facility]     NO HISTORY OF SURGERY       Family History:   Family History   Problem Relation Age of Onset     Other Cancer Maternal Grandmother         brain tumor     Other Cancer Maternal Grandfather         brain tumor     Diabetes No family hx of      Hypertension No family hx of      Prostate Cancer No family hx of      Colon Cancer No family hx  of      Breast Cancer No family hx of      Social History:   Social History     Tobacco Use     Smoking status: Never Smoker     Smokeless tobacco: Never Used   Substance Use Topics     Alcohol use: Yes     Comment: 1 beer a day 5-7 drinks per week       Review of Systems:  Constitutional:  NEGATIVE for fever, chills, change in weight  Integumentary/Skin:  NEGATIVE for worrisome rashes, moles or lesions  Eyes:  NEGATIVE for vision changes or irritation  ENT/Mouth:  NEGATIVE for ear, mouth and throat problems  Resp:  NEGATIVE for significant cough or SOB  Breast:  NEGATIVE for masses, tenderness or discharge  CV:  NEGATIVE for chest pain, palpitations or peripheral edema  GI:  NEGATIVE for nausea, abdominal pain, heartburn, or change in bowel habits  :  Negative   Musculoskeletal:  See HPI above  Neuro:  NEGATIVE for weakness, dizziness or paresthesias  Endocrine:  NEGATIVE for temperature intolerance, skin/hair changes  Heme/allergy/immune:  NEGATIVE for bleeding problems  Psychiatric:  NEGATIVE for changes in mood or affect    OBJECTIVE:  Physical Exam:  There were no vitals taken for this visit.  General Appearance: healthy, alert and no distress   Skin: no suspicious lesions or rashes  Neuro: Normal strength and tone, mentation intact and speech normal  Vascular: good pulses, and cappillary refill   Lymph: no lymphadenopathy   Psych:  mentation appears normal and affect normal/bright  Resp: no increased work of breathing     Left Hand Exam:  Inspection: a mild boutonierre deformity of the left 4th finger..  Masses: none  ROM: able to make a full fist  Is slightly short of full active PIP extension  DIP lacks some flexion  Moderate swelling of the left 4th PIP  Tenderness: over the PIP   Collateral ligament stability of the 4th PIP: stable, mild pain with varus-valgus testing    X-rays:  None      ASSESSMENT:   Encounter Diagnoses   Name Primary?     Jammed interphalangeal joint of finger of left hand, initial  encounter Yes     Finger injury, left, initial encounter     possible early boutonierre    PLAN:   Oval -8 splint to the PIP joint for the next 3 weeks  Work on DIP flexion  Ok to remove splint for hygiene. Suggested taping finger for playing basketball   Explained that a jammed finger can take up to 3 months to get better.  Return to clinic: 3-4 weeks or as needed     LUCIA Reyes MD  Dept. Orthopedic Surgery  [facility]       Again, thank you for allowing me to participate in the care of your patient.        Sincerely,        Shabbir Reyes MD

## 2022-03-07 ENCOUNTER — OFFICE VISIT (OUTPATIENT)
Dept: FAMILY MEDICINE | Facility: CLINIC | Age: 27
End: 2022-03-07
Payer: COMMERCIAL

## 2022-03-07 VITALS
OXYGEN SATURATION: 99 % | BODY MASS INDEX: 27.23 KG/M2 | RESPIRATION RATE: 16 BRPM | WEIGHT: 212.2 LBS | HEIGHT: 74 IN | HEART RATE: 48 BPM | TEMPERATURE: 97.9 F

## 2022-03-07 DIAGNOSIS — Z11.59 NEED FOR HEPATITIS C SCREENING TEST: ICD-10-CM

## 2022-03-07 DIAGNOSIS — Z00.00 ROUTINE GENERAL MEDICAL EXAMINATION AT A HEALTH CARE FACILITY: Primary | ICD-10-CM

## 2022-03-07 DIAGNOSIS — Z11.4 SCREENING FOR HIV (HUMAN IMMUNODEFICIENCY VIRUS): ICD-10-CM

## 2022-03-07 DIAGNOSIS — J30.9 ALLERGIC RHINITIS, UNSPECIFIED SEASONALITY, UNSPECIFIED TRIGGER: ICD-10-CM

## 2022-03-07 DIAGNOSIS — J01.10 ACUTE NON-RECURRENT FRONTAL SINUSITIS: ICD-10-CM

## 2022-03-07 PROCEDURE — 99395 PREV VISIT EST AGE 18-39: CPT | Performed by: FAMILY MEDICINE

## 2022-03-07 PROCEDURE — 36415 COLL VENOUS BLD VENIPUNCTURE: CPT | Performed by: FAMILY MEDICINE

## 2022-03-07 PROCEDURE — 87902 NFCT AGT GNTYP ALYS HEP C: CPT | Performed by: FAMILY MEDICINE

## 2022-03-07 PROCEDURE — 86803 HEPATITIS C AB TEST: CPT | Performed by: FAMILY MEDICINE

## 2022-03-07 PROCEDURE — 99213 OFFICE O/P EST LOW 20 MIN: CPT | Mod: 25 | Performed by: FAMILY MEDICINE

## 2022-03-07 PROCEDURE — 80053 COMPREHEN METABOLIC PANEL: CPT | Performed by: FAMILY MEDICINE

## 2022-03-07 PROCEDURE — 87389 HIV-1 AG W/HIV-1&-2 AB AG IA: CPT | Performed by: FAMILY MEDICINE

## 2022-03-07 RX ORDER — PREDNISONE 20 MG/1
20 TABLET ORAL DAILY
Qty: 10 TABLET | Refills: 0 | Status: SHIPPED | OUTPATIENT
Start: 2022-03-07 | End: 2022-03-07

## 2022-03-07 RX ORDER — CEFUROXIME AXETIL 500 MG/1
500 TABLET ORAL 2 TIMES DAILY
Qty: 20 TABLET | Refills: 0 | Status: SHIPPED | OUTPATIENT
Start: 2022-03-07 | End: 2022-04-25

## 2022-03-07 RX ORDER — CETIRIZINE HYDROCHLORIDE 10 MG/1
10 TABLET ORAL DAILY
Qty: 90 TABLET | Refills: 3 | Status: SHIPPED | OUTPATIENT
Start: 2022-03-07

## 2022-03-07 RX ORDER — PREDNISONE 20 MG/1
20 TABLET ORAL 2 TIMES DAILY
Qty: 10 TABLET | Refills: 0 | Status: SHIPPED | OUTPATIENT
Start: 2022-03-07 | End: 2022-04-25

## 2022-03-07 ASSESSMENT — ENCOUNTER SYMPTOMS
ABDOMINAL PAIN: 0
JOINT SWELLING: 0
ARTHRALGIAS: 0
PALPITATIONS: 0
SORE THROAT: 0
HEMATURIA: 0
SHORTNESS OF BREATH: 0
HEMATOLOGIC/LYMPHATIC NEGATIVE: 1
HEADACHES: 0
FEVER: 0
NERVOUS/ANXIOUS: 0
HEMATOCHEZIA: 0
DIARRHEA: 0
CONSTIPATION: 0
EYE PAIN: 0
DYSURIA: 0
COUGH: 0
DIZZINESS: 1
CHILLS: 0
HEARTBURN: 0
ENDOCRINE NEGATIVE: 1
NAUSEA: 0
ALLERGIC/IMMUNOLOGIC NEGATIVE: 1
WEAKNESS: 0
PARESTHESIAS: 0
MYALGIAS: 0
FREQUENCY: 0

## 2022-03-07 ASSESSMENT — PAIN SCALES - GENERAL: PAINLEVEL: NO PAIN (0)

## 2022-03-07 NOTE — PROGRESS NOTES
SUBJECTIVE:   CC: Raj Smith is an 26 year old male who presents for preventative health visit.   History of sinusitis infections.  Had an allergies testing in 11/2016, was negative.  Had Sinuses CT scan, showed chronic maxillary sinusitis.    Had  Lipid panel and blood glucose thru work.    Patient has been advised of split billing requirements and indicates understanding: Yes  Healthy Habits:     Getting at least 3 servings of Calcium per day:  Yes    Bi-annual eye exam:  Yes    Dental care twice a year:  Yes    Sleep apnea or symptoms of sleep apnea:  Daytime drowsiness    Diet:  Regular (no restrictions)    Frequency of exercise:  2-3 days/week    Duration of exercise:  15-30 minutes    Taking medications regularly:  Yes    Barriers to taking medications:  None    Medication side effects:  Not applicable    PHQ-2 Total Score: 0    Additional concerns today:  Yes        Today's PHQ-2 Score:   PHQ-2 ( 1999 Pfizer) 3/7/2022   Q1: Little interest or pleasure in doing things 0   Q2: Feeling down, depressed or hopeless 0   PHQ-2 Score 0   PHQ-2 Total Score (12-17 Years)- Positive if 3 or more points; Administer PHQ-A if positive -   Q1: Little interest or pleasure in doing things Not at all   Q2: Feeling down, depressed or hopeless Not at all   PHQ-2 Score 0       Abuse: Current or Past(Physical, Sexual or Emotional)- No  Do you feel safe in your environment? Yes    Have you ever done Advance Care Planning? (For example, a Health Directive, POLST, or a discussion with a medical provider or your loved ones about your wishes): No, advance care planning information given to patient to review.  Patient plans to discuss their wishes with loved ones or provider.      Social History     Tobacco Use     Smoking status: Never Smoker     Smokeless tobacco: Never Used   Substance Use Topics     Alcohol use: Yes     Comment: 1 beer a day 5-7 drinks per week     If you drink alcohol do you typically have >3 drinks per day  or >7 drinks per week? No    Alcohol Use 3/7/2022   Prescreen: >3 drinks/day or >7 drinks/week? No   AUDIT SCORE  -     AUDIT - Alcohol Use Disorders Identification Test - Reproduced from the World Health Organization Audit 2001 (Second Edition) 2/5/2021   1.  How often do you have a drink containing alcohol? 4 or more times a week   2.  How many drinks containing alcohol do you have on a typical day when you are drinking? 1 or 2   3.  How often do you have five or more drinks on one occasion? Never   4.  How often during the last year have you found that you were not able to stop drinking once you had started? Never   5.  How often during the last year have you failed to do what was normally expected of you because of drinking? Never   6.  How often during the last year have you needed a first drink in the morning to get yourself going after a heavy drinking session? Never   7.  How often during the last year have you had a feeling of guilt or remorse after drinking? Never   8.  How often during the last year have you been unable to remember what happened the night before because of your drinking? Never   9.  Have you or someone else been injured because of your drinking? No   10. Has a relative, friend, doctor or other health care worker been concerned about your drinking or suggested you cut down? No   TOTAL SCORE 4       Last PSA: No results found for: PSA    Reviewed orders with patient. Reviewed health maintenance and updated orders accordingly - Yes  Lab work is in process  Labs reviewed in EPIC    Reviewed and updated as needed this visit by clinical staff   Tobacco  Allergies  Meds   Med Hx  Surg Hx  Fam Hx  Soc Hx        Reviewed and updated as needed this visit by Provider                 History reviewed. No pertinent past medical history.   Past Surgical History:   Procedure Laterality Date     COLONOSCOPY N/A 3/3/2021    Procedure: Colonoscopy, With Polypectomy And Biopsy;  Surgeon: Benjamin  Josemanuel Lino MD;  Location: [facility]     COLONOSCOPY WITH CO2 INSUFFLATION N/A 3/3/2021    Procedure: COLONOSCOPY, WITH CO2 INSUFFLATION;  Surgeon: Josemanuel Alexander MD;  Location: [facility]     COMBINED ESOPHAGOSCOPY, GASTROSCOPY, DUODENOSCOPY (EGD) WITH CO2 INSUFFLATION N/A 3/3/2021    Procedure: ESOPHAGOGASTRODUODENOSCOPY, WITH CO2 INSUFFLATION;  Surgeon: Josemanuel Alexander MD;  Location: [facility]     ESOPHAGOSCOPY, GASTROSCOPY, DUODENOSCOPY (EGD), COMBINED N/A 3/3/2021    Procedure: Esophagogastroduodenoscopy, With Biopsy;  Surgeon: Josemanuel Alexander MD;  Location: [facility]     NO HISTORY OF SURGERY       OB History   No obstetric history on file.       Review of Systems   Constitutional: Negative for chills and fever.   HENT: Negative for congestion, ear pain, hearing loss and sore throat.    Eyes: Negative for pain and visual disturbance.   Respiratory: Negative for cough and shortness of breath.    Cardiovascular: Negative for chest pain, palpitations and peripheral edema.   Gastrointestinal: Negative for abdominal pain, constipation, diarrhea, heartburn, hematochezia and nausea.   Endocrine: Negative.    Genitourinary: Negative for dysuria, frequency, genital sores, hematuria, impotence, penile discharge and urgency.   Musculoskeletal: Negative for arthralgias, joint swelling and myalgias.   Skin: Negative for rash.   Allergic/Immunologic: Negative.    Neurological: Positive for dizziness. Negative for weakness, headaches and paresthesias.   Hematological: Negative.    Psychiatric/Behavioral: Negative for mood changes. The patient is not nervous/anxious.      CONSTITUTIONAL: NEGATIVE for fever, chills, change in weight  INTEGUMENTARY/SKIN: NEGATIVE for worrisome rashes, moles or lesions  ENT: NEGATIVE for ear, mouth and throat problems  RESP: NEGATIVE for significant cough or SOB  CV: NEGATIVE for chest pain, palpitations or peripheral edema  GI: NEGATIVE for nausea, abdominal pain,  "heartburn, or change in bowel habits   male: negative for dysuria, hematuria, decreased urinary stream, erectile dysfunction, urethral discharge  MUSCULOSKELETAL: NEGATIVE for significant arthralgias or myalgia  NEURO: NEGATIVE for weakness, dizziness or paresthesias  ENDOCRINE: NEGATIVE for temperature intolerance, skin/hair changes  HEME/ALLERGY/IMMUNE: NEGATIVE for bleeding problems  PSYCHIATRIC: NEGATIVE for changes in mood or affect    OBJECTIVE:   Pulse (!) 48   Temp 97.9  F (36.6  C) (Oral)   Resp 16   Ht 1.89 m (6' 2.41\")   Wt 96.3 kg (212 lb 3.2 oz)   SpO2 99%   BMI 26.95 kg/m      Physical Exam  GENERAL: healthy, alert and no distress  HENT: ear canals and TM's normal, nose and mouth without ulcers or lesions  NECK: no adenopathy, no asymmetry, masses, or scars and thyroid normal to palpation  RESP: lungs clear to auscultation - no rales, rhonchi or wheezes  CV: regular rate and rhythm, normal S1 S2, no S3 or S4, no murmur, click or rub, no peripheral edema and peripheral pulses strong  ABDOMEN: soft, nontender, no hepatosplenomegaly, no masses and bowel sounds normal  MS: no gross musculoskeletal defects noted, no edema  SKIN: no suspicious lesions or rashes  NEURO: Normal strength and tone, mentation intact and speech normal  PSYCH: mentation appears normal, affect normal/bright    Diagnostic Test Results:  Labs reviewed in Epic  Orders Placed This Encounter   Procedures     REVIEW OF HEALTH MAINTENANCE PROTOCOL ORDERS     HIV Antigen Antibody Combo     Hepatitis C Screen Reflex to HCV RNA Quant and Genotype     Comprehensive metabolic panel (BMP + Alb, Alk Phos, ALT, AST, Total. Bili, TP)       ASSESSMENT/PLAN:   (Z00.00) Routine general medical examination at a health care facility  (primary encounter diagnosis)  Comment:   Plan: Comprehensive metabolic panel (BMP + Alb, Alk         Phos, ALT, AST, Total. Bili, TP)        Normal exam  Routine preventive care discussed  Advised with diet and " "weight loss    (Z11.4) Screening for HIV (human immunodeficiency virus)  Comment:   Plan: HIV Antigen Antibody Combo        screening    (Z11.59) Need for hepatitis C screening test  Comment:   Plan: Hepatitis C Screen Reflex to HCV RNA Quant and         Genotype        screening    (J01.10) Acute non-recurrent frontal sinusitis  Comment:   Plan: cefuroxime (CEFTIN) 500 MG tablet, predniSONE         (DELTASONE) 20 MG tablet, DISCONTINUED:         predniSONE (DELTASONE) 20 MG tablet        Will treat with Ceftin and Prednisone  May need a longer course of Antibiotics, 2 more weeks, total of 4 weeks.  May need to have a repeat CT scan, and or referral to ENT    (J30.9) Allergic rhinitis, unspecified seasonality, unspecified trigger  Comment:   Plan: cetirizine (ZYRTEC) 10 MG tablet              Patient has been advised of split billing requirements and indicates understanding: Yes    COUNSELING:   Reviewed preventive health counseling, as reflected in patient instructions       Regular exercise       Healthy diet/nutrition       Vision screening       Consider Hep C screening for all patients one time for ages 18-79 years       HIV screeninx in teen years, 1x in adult years, and at intervals if high risk    Estimated body mass index is 26.95 kg/m  as calculated from the following:    Height as of this encounter: 1.89 m (6' 2.41\").    Weight as of this encounter: 96.3 kg (212 lb 3.2 oz).     Weight management plan: Discussed healthy diet and exercise guidelines    He reports that he has never smoked. He has never used smokeless tobacco.      Counseling Resources:  ATP IV Guidelines  Pooled Cohorts Equation Calculator  FRAX Risk Assessment  ICSI Preventive Guidelines  Dietary Guidelines for Americans, 2010  USDA's MyPlate  ASA Prophylaxis  Lung CA Screening    Kaylee Szymanski MD  [facility]  "

## 2022-03-08 ENCOUNTER — MYC MEDICAL ADVICE (OUTPATIENT)
Dept: FAMILY MEDICINE | Facility: CLINIC | Age: 27
End: 2022-03-08
Payer: COMMERCIAL

## 2022-03-08 LAB
ALBUMIN SERPL-MCNC: 4 G/DL (ref 3.4–5)
ALP SERPL-CCNC: 65 U/L (ref 40–150)
ALT SERPL W P-5'-P-CCNC: 21 U/L (ref 0–70)
ANION GAP SERPL CALCULATED.3IONS-SCNC: 4 MMOL/L (ref 3–14)
AST SERPL W P-5'-P-CCNC: 13 U/L (ref 0–45)
BILIRUB SERPL-MCNC: 0.6 MG/DL (ref 0.2–1.3)
BUN SERPL-MCNC: 10 MG/DL (ref 7–30)
CALCIUM SERPL-MCNC: 8.6 MG/DL (ref 8.5–10.1)
CHLORIDE BLD-SCNC: 108 MMOL/L (ref 94–109)
CO2 SERPL-SCNC: 27 MMOL/L (ref 20–32)
CREAT SERPL-MCNC: 0.93 MG/DL (ref 0.66–1.25)
GFR SERPL CREATININE-BSD FRML MDRD: >90 ML/MIN/1.73M2
GLUCOSE BLD-MCNC: 87 MG/DL (ref 70–99)
HIV 1+2 AB+HIV1 P24 AG SERPL QL IA: NONREACTIVE
POTASSIUM BLD-SCNC: 4 MMOL/L (ref 3.4–5.3)
PROT SERPL-MCNC: 7.2 G/DL (ref 6.8–8.8)
SODIUM SERPL-SCNC: 139 MMOL/L (ref 133–144)

## 2022-03-08 NOTE — TELEPHONE ENCOUNTER
RN replied to patient via [employer]. See message for details.     Isaias Delaney RN, BSN, PHN  [facility]: [place]

## 2022-03-09 LAB
HCV AB SERPL QL IA: REACTIVE
HCV RNA SERPL NAA+PROBE-ACNC: NOT DETECTED IU/ML

## 2022-04-25 DIAGNOSIS — J01.10 ACUTE NON-RECURRENT FRONTAL SINUSITIS: ICD-10-CM

## 2022-04-25 RX ORDER — CEFUROXIME AXETIL 500 MG/1
500 TABLET ORAL 2 TIMES DAILY
Qty: 28 TABLET | Refills: 0 | Status: SHIPPED | OUTPATIENT
Start: 2022-04-25 | End: 2022-05-09

## 2022-04-25 RX ORDER — PREDNISONE 20 MG/1
20 TABLET ORAL 2 TIMES DAILY
Qty: 10 TABLET | Refills: 0 | Status: SHIPPED | OUTPATIENT
Start: 2022-04-25 | End: 2023-02-16

## 2022-07-07 ENCOUNTER — MYC MEDICAL ADVICE (OUTPATIENT)
Dept: FAMILY MEDICINE | Facility: CLINIC | Age: 27
End: 2022-07-07

## 2022-07-07 NOTE — TELEPHONE ENCOUNTER
RN replied to patient via [facility] [employer]. See message for details.     Isaias Delaney RN, BSN, PHN  [facility]: [place]

## 2022-09-11 ENCOUNTER — HEALTH MAINTENANCE LETTER (OUTPATIENT)
Age: 27
End: 2022-09-11

## 2023-01-02 ENCOUNTER — TRANSFERRED RECORDS (OUTPATIENT)
Dept: HEALTH INFORMATION MANAGEMENT | Facility: CLINIC | Age: 28
End: 2023-01-02

## 2023-02-16 ENCOUNTER — OFFICE VISIT (OUTPATIENT)
Dept: FAMILY MEDICINE | Facility: CLINIC | Age: 28
End: 2023-02-16
Payer: COMMERCIAL

## 2023-02-16 VITALS
BODY MASS INDEX: 26.95 KG/M2 | TEMPERATURE: 98.2 F | HEART RATE: 56 BPM | WEIGHT: 210 LBS | DIASTOLIC BLOOD PRESSURE: 70 MMHG | HEIGHT: 74 IN | SYSTOLIC BLOOD PRESSURE: 110 MMHG | OXYGEN SATURATION: 97 %

## 2023-02-16 DIAGNOSIS — K21.9 GASTROESOPHAGEAL REFLUX DISEASE WITHOUT ESOPHAGITIS: ICD-10-CM

## 2023-02-16 DIAGNOSIS — Z00.00 ROUTINE GENERAL MEDICAL EXAMINATION AT A HEALTH CARE FACILITY: Primary | ICD-10-CM

## 2023-02-16 PROBLEM — R12 HEARTBURN: Status: RESOLVED | Noted: 2021-02-06 | Resolved: 2023-02-16

## 2023-02-16 PROCEDURE — 99395 PREV VISIT EST AGE 18-39: CPT | Performed by: FAMILY MEDICINE

## 2023-02-16 NOTE — PROGRESS NOTES
SUBJECTIVE:   CC: Raj is an 27 year old who presents for preventative health visit.   Comes for an annual exam, patient reports he is doing well.  He reports he had labs done through work,  lipid panels, liver labs.    Patient has been advised of split billing requirements and indicates understanding: Yes  Healthy Habits:     Getting at least 3 servings of Calcium per day:  Yes    Bi-annual eye exam:  Yes    Dental care twice a year:  Yes    Sleep apnea or symptoms of sleep apnea:  None (Curious about it. )    Diet:  Regular (no restrictions)    Frequency of exercise:  2-3 days/week    Duration of exercise:  Greater than 60 minutes    Taking medications regularly:  No    Barriers to taking medications:  Not applicable    Medication side effects:  Not applicable    PHQ-2 Total Score: 0    Additional concerns today:  No  History of Present Illness       Reason for visit:  Annual care visit    He eats 2-3 servings of fruits and vegetables daily.He consumes 0 sweetened beverage(s) daily.He exercises with enough effort to increase his heart rate 20 to 29 minutes per day.  He exercises with enough effort to increase his heart rate 5 days per week.   He is not taking prescribed medications regularly due to Not applicable.      Today's PHQ-2 Score:   PHQ-2 ( 1999 Pfizer) 2/16/2023   Q1: Little interest or pleasure in doing things 0   Q2: Feeling down, depressed or hopeless 0   PHQ-2 Score 0   PHQ-2 Total Score (12-17 Years)- Positive if 3 or more points; Administer PHQ-A if positive -   Q1: Little interest or pleasure in doing things Not at all   Q2: Feeling down, depressed or hopeless Not at all   PHQ-2 Score 0       Social History     Tobacco Use     Smoking status: Never     Smokeless tobacco: Never   Substance Use Topics     Alcohol use: Yes     Comment: 1 beer a day 5-7 drinks per week     If you drink alcohol do you typically have >3 drinks per day or >7 drinks per week? No    Alcohol Use 3/7/2022   Prescreen: >3  drinks/day or >7 drinks/week? No   Prescreen: >3 drinks/day or >7 drinks/week? -   AUDIT SCORE  -     AUDIT - Alcohol Use Disorders Identification Test - Reproduced from the World Health Organization Audit 2001 (Second Edition) 2/5/2021   1.  How often do you have a drink containing alcohol? 4 or more times a week   2.  How many drinks containing alcohol do you have on a typical day when you are drinking? 1 or 2   3.  How often do you have five or more drinks on one occasion? Never   4.  How often during the last year have you found that you were not able to stop drinking once you had started? Never   5.  How often during the last year have you failed to do what was normally expected of you because of drinking? Never   6.  How often during the last year have you needed a first drink in the morning to get yourself going after a heavy drinking session? Never   7.  How often during the last year have you had a feeling of guilt or remorse after drinking? Never   8.  How often during the last year have you been unable to remember what happened the night before because of your drinking? Never   9.  Have you or someone else been injured because of your drinking? No   10. Has a relative, friend, doctor or other health care worker been concerned about your drinking or suggested you cut down? No   TOTAL SCORE 4       Last PSA: No results found for: PSA    Reviewed orders with patient. Reviewed health maintenance and updated orders accordingly - Yes    Reviewed and updated as needed this visit by clinical staff      Problems             Reviewed and updated as needed this visit by Provider      Problems            Past Medical History:   Diagnosis Date     Gastroesophageal reflux disease without esophagitis 2/16/2023      Past Surgical History:   Procedure Laterality Date     COLONOSCOPY N/A 3/3/2021    Procedure: Colonoscopy, With Polypectomy And Biopsy;  Surgeon: Josemanuel Alexander MD;  Location:  OR      COLONOSCOPY WITH CO2 INSUFFLATION N/A 3/3/2021    Procedure: COLONOSCOPY, WITH CO2 INSUFFLATION;  Surgeon: Josemanuel Alexander MD;  Location: [facility]     COMBINED ESOPHAGOSCOPY, GASTROSCOPY, DUODENOSCOPY (EGD) WITH CO2 INSUFFLATION N/A 3/3/2021    Procedure: ESOPHAGOGASTRODUODENOSCOPY, WITH CO2 INSUFFLATION;  Surgeon: Josemanuel Alexander MD;  Location: [facility]     ESOPHAGOSCOPY, GASTROSCOPY, DUODENOSCOPY (EGD), COMBINED N/A 3/3/2021    Procedure: Esophagogastroduodenoscopy, With Biopsy;  Surgeon: Josemanuel Alexander MD;  Location: [facility] OR     NO HISTORY OF SURGERY       OB History   No obstetric history on file.       Review of Systems  CONSTITUTIONAL: NEGATIVE for fever, chills, change in weight  INTEGUMENTARY/SKIN: NEGATIVE for worrisome rashes, moles or lesions  EYES: NEGATIVE for vision changes or irritation  ENT: NEGATIVE for ear, mouth and throat problems  RESP: NEGATIVE for significant cough or SOB  CV: NEGATIVE for chest pain, palpitations or peripheral edema  GI: NEGATIVE for nausea, abdominal pain, heartburn, or change in bowel habits   male: negative for dysuria, hematuria, decreased urinary stream, erectile dysfunction, urethral discharge  MUSCULOSKELETAL: NEGATIVE for significant arthralgias or myalgia  NEURO: NEGATIVE for weakness, dizziness or paresthesias  ENDOCRINE: NEGATIVE for temperature intolerance, skin/hair changes  HEME/ALLERGY/IMMUNE: NEGATIVE for bleeding problems  PSYCHIATRIC: NEGATIVE for changes in mood or affect    OBJECTIVE:   There were no vitals taken for this visit.    Physical Exam  GENERAL: healthy, alert and no distress  EYES: Eyes grossly normal to inspection, PERRL and conjunctivae and sclerae normal  HENT: ear canals and TM's normal, nose and mouth without ulcers or lesions  NECK: no adenopathy, no asymmetry, masses, or scars and thyroid normal to palpation  RESP: lungs clear to auscultation - no rales, rhonchi or wheezes  CV: regular rate and rhythm,  "normal S1 S2, no S3 or S4, no murmur, click or rub, no peripheral edema and peripheral pulses strong  ABDOMEN: soft, nontender, no hepatosplenomegaly, no masses and bowel sounds normal  MS: no gross musculoskeletal defects noted, no edema  SKIN: no suspicious lesions or rashes  NEURO: Normal strength and tone, mentation intact and speech normal  PSYCH: mentation appears normal, affect normal/bright    Diagnostic Test Results:  Labs reviewed in Epic  No orders of the defined types were placed in this encounter.      ASSESSMENT/PLAN:   (Z00.00) Routine general medical examination at a health care facility  (primary encounter diagnosis)  Comment: normal exam  Plan: Routine preventive care discussed.  Advised with diet, exercise, increase physical activities.  Advised patient to avoid late meals.  He is up-to-dated with all his immunizations.  one year annual exam follow-up recommended    (K21.9) Gastroesophageal reflux disease without esophagitis  Comment:   Plan: omeprazole (PRILOSEC) 20 MG DR capsule        Diet modifications  Avoid spicy, acidic food.      Patient has been advised of split billing requirements and indicates understanding: Yes      COUNSELING:   Reviewed preventive health counseling, as reflected in patient instructions       Regular exercise       Healthy diet/nutrition      BMI:   Estimated body mass index is 26.95 kg/m  as calculated from the following:    Height as of 3/7/22: 1.89 m (6' 2.41\").    Weight as of 3/7/22: 96.3 kg (212 lb 3.2 oz).   Weight management plan: Discussed healthy diet and exercise guidelines      He reports that he has never smoked. He has never used smokeless tobacco.            Kaylee Szymanski MD  [facility]  "

## 2023-02-16 NOTE — PROGRESS NOTES
SUBJECTIVE:   CC: Raj is an 27 year old who presents for preventative health visit.     Patient has been advised of split billing requirements and indicates understanding: Yes  Healthy Habits:     Taking medications regularly:  0    PHQ-2 Total Score: 0  History of Present Illness       Reason for visit:  Annual care visit    He eats 2-3 servings of fruits and vegetables daily.He consumes 0 sweetened beverage(s) daily.He exercises with enough effort to increase his heart rate 20 to 29 minutes per day.  He exercises with enough effort to increase his heart rate 5 days per week.   He is taking medications regularly.    {Add if <65 person on Medicare  - Required Questions (Optional):827670}  {Outside tests to abstract? :985973}    {additional problems to add (Optional):115503}    Today's PHQ-2 Score:   PHQ-2 ( 1999 Pfizer) 2/16/2023   Q1: Little interest or pleasure in doing things 0   Q2: Feeling down, depressed or hopeless 0   PHQ-2 Score 0   PHQ-2 Total Score (12-17 Years)- Positive if 3 or more points; Administer PHQ-A if positive -   Q1: Little interest or pleasure in doing things Not at all   Q2: Feeling down, depressed or hopeless Not at all   PHQ-2 Score 0           Social History     Tobacco Use     Smoking status: Never     Smokeless tobacco: Never   Substance Use Topics     Alcohol use: Yes     Comment: 1 beer a day 5-7 drinks per week     {Rooming Staff- Complete this question if Prescreen response is not shown below for today's visit. If you drink alcohol do you typically have >3 drinks per day or >7 drinks per week? (Optional):115344}    Alcohol Use 3/7/2022   Prescreen: >3 drinks/day or >7 drinks/week? No   Prescreen: >3 drinks/day or >7 drinks/week? -   AUDIT SCORE  -   {add AUDIT responses (Optional) (A score of 7 for adult men is an indication of hazardous drinking; a score of 8 or more is an indication of an alcohol use disorder.  A score of 7 or more for adult women is an indication of hazardous  "drinking or an alchohol use disorder):045882}    Last PSA: No results found for: PSA    Reviewed orders with patient. Reviewed health maintenance and updated orders accordingly - { :839095::\"Yes\"}  {Chronicprobdata (optional):032589}    Reviewed and updated as needed this visit by clinical staff                  Reviewed and updated as needed this visit by Provider                 {HISTORY OPTIONS (Optional):547139}    Review of Systems  {MALE ROS (Optional):789105::\"CONSTITUTIONAL: NEGATIVE for fever, chills, change in weight\",\"INTEGUMENTARY/SKIN: NEGATIVE for worrisome rashes, moles or lesions\",\"EYES: NEGATIVE for vision changes or irritation\",\"ENT: NEGATIVE for ear, mouth and throat problems\",\"RESP: NEGATIVE for significant cough or SOB\",\"CV: NEGATIVE for chest pain, palpitations or peripheral edema\",\"GI: NEGATIVE for nausea, abdominal pain, heartburn, or change in bowel habits\",\" male: negative for dysuria, hematuria, decreased urinary stream, erectile dysfunction, urethral discharge\",\"MUSCULOSKELETAL: NEGATIVE for significant arthralgias or myalgia\",\"NEURO: NEGATIVE for weakness, dizziness or paresthesias\",\"PSYCHIATRIC: NEGATIVE for changes in mood or affect\"}    OBJECTIVE:   There were no vitals taken for this visit.    Physical Exam  {Exam Choices (Optional):180321}    {Diagnostic Test Results (Optional):247928::\"Diagnostic Test Results:\",\"Labs reviewed in Epic\"}    ASSESSMENT/PLAN:   {Diag Picklist:258336}    {Patient advised of split billing (Optional):558374}      COUNSELING:   {MALE COUNSELING MESSAGES:874157::\"Reviewed preventive health counseling, as reflected in patient instructions\"}      BMI:   Estimated body mass index is 26.95 kg/m  as calculated from the following:    Height as of 3/7/22: 1.89 m (6' 2.41\").    Weight as of 3/7/22: 96.3 kg (212 lb 3.2 oz).   {Weight Management Plan needed for ACO:276030}      He reports that he has never smoked. He has never used smokeless " tobacco.      {Counseling Resources  [place] Preventive Services Task Force  Cholesterol Screening  Health diet/nutrition  Pooled Cohorts Equation Calculator  USDA's MyPlate  ASA Prophylaxis  Lung CA Screening  Osteoporosis prevention/bone health :764391}  {Prostate Cancer Screening  Consider for men 55-69 per guidance from USPSTF :102809}    Kaylee Szymanski MD  [facility]

## 2023-09-14 ENCOUNTER — OFFICE VISIT (OUTPATIENT)
Dept: FAMILY MEDICINE | Facility: CLINIC | Age: 28
End: 2023-09-14
Payer: COMMERCIAL

## 2023-09-14 VITALS
RESPIRATION RATE: 18 BRPM | DIASTOLIC BLOOD PRESSURE: 83 MMHG | SYSTOLIC BLOOD PRESSURE: 105 MMHG | HEART RATE: 64 BPM | WEIGHT: 221 LBS | HEIGHT: 74 IN | TEMPERATURE: 97.6 F | OXYGEN SATURATION: 98 % | BODY MASS INDEX: 28.36 KG/M2

## 2023-09-14 DIAGNOSIS — K21.9 GASTROESOPHAGEAL REFLUX DISEASE WITHOUT ESOPHAGITIS: Primary | ICD-10-CM

## 2023-09-14 DIAGNOSIS — G47.30 SLEEP APNEA, UNSPECIFIED TYPE: ICD-10-CM

## 2023-09-14 DIAGNOSIS — J45.990 EXERCISE-INDUCED ASTHMA: ICD-10-CM

## 2023-09-14 PROCEDURE — 99214 OFFICE O/P EST MOD 30 MIN: CPT | Performed by: FAMILY MEDICINE

## 2023-09-14 RX ORDER — ALBUTEROL SULFATE 90 UG/1
2 AEROSOL, METERED RESPIRATORY (INHALATION) EVERY 6 HOURS PRN
Qty: 18 G | COMMUNITY
Start: 2023-09-14 | End: 2023-09-14

## 2023-09-14 RX ORDER — ALBUTEROL SULFATE 90 UG/1
2 AEROSOL, METERED RESPIRATORY (INHALATION) EVERY 6 HOURS PRN
Qty: 36 G | Refills: 3 | Status: SHIPPED | OUTPATIENT
Start: 2023-09-14

## 2023-09-14 ASSESSMENT — PAIN SCALES - GENERAL: PAINLEVEL: NO PAIN (0)

## 2023-09-14 NOTE — PROGRESS NOTES
"  Assessment & Plan     Gastroesophageal reflux disease without esophagitis  Use as needed  Diet modifications.  - omeprazole (PRILOSEC) 20 MG DR capsule; Take 1 capsule (20 mg) by mouth daily    Sleep apnea, unspecified type  Referred  Advised to avoid late meals, weight loss  - Adult Sleep Eval & Management  Referral; Future    Exercise-induced asthma  Use albuterol inh as needed  - General PFT Lab (Please always keep checked); Future  - albuterol (PROAIR HFA/PROVENTIL HFA/VENTOLIN HFA) 108 (90 Base) MCG/ACT inhaler; Inhale 2 puffs into the lungs every 6 hours as needed for shortness of breath, wheezing or cough       BMI:   Estimated body mass index is 28.06 kg/m  as calculated from the following:    Height as of this encounter: 1.89 m (6' 2.41\").    Weight as of this encounter: 100.2 kg (221 lb).   Weight management plan: Discussed healthy diet and exercise guidelines    Work on weight loss  Regular exercise    Kaylee Szymanski MD  [facility]    Larisa Anthony is a 28 year old, presenting for the following health issues:  Breathing Problem  Patient reports a few weeks ago he was coughing and wheezing.  He was using his albuterol inhaler is more frequent.  He reports sometimes when he exerts himself, before expiratory wheezing.  He never been diagnosed with asthma.  He does not smoke.  He does have allergy take Zyrtec.    Concern of possible sleep apnea.        9/14/2023    10:33 AM   Additional Questions   Roomed by Taisha GOODMAN CMA   Accompanied by Self         9/14/2023    10:33 AM   Patient Reported Additional Medications   Patient reports taking the following new medications None       History of Present Illness     Asthma:  He presents for follow up of asthma.  He has no cough, some wheezing, and some shortness of breath.  He is using a relief medication a few times a week. He does not have a controller medication. Patient is aware of the following triggers: " "unaware of any triggers, cold air, dust mites, pollens, smoke and strong odors and fumes. The patient has not had a visit to the Emergency Room, Urgent Care or Hospital due to asthma since the last clinic visit.     He eats 4 or more servings of fruits and vegetables daily.He consumes 1 sweetened beverage(s) daily.He exercises with enough effort to increase his heart rate 20 to 29 minutes per day.  He exercises with enough effort to increase his heart rate 4 days per week.   He is taking medications regularly.       Review of Systems   Constitutional, HEENT, cardiovascular, pulmonary, GI, , musculoskeletal, neuro, skin, endocrine and psych systems are negative, except as otherwise noted.      Objective    /83 (BP Location: Right arm, Patient Position: Chair, Cuff Size: Adult Large)   Pulse 64   Temp 97.6  F (36.4  C) (Oral)   Resp 18   Ht 1.89 m (6' 2.41\")   Wt 100.2 kg (221 lb)   SpO2 98%   BMI 28.06 kg/m    Body mass index is 28.06 kg/m .  Physical Exam   GENERAL: healthy, alert and no distress  NECK: no adenopathy, no asymmetry, masses, or scars and thyroid normal to palpation  RESP: lungs clear to auscultation - no rales, rhonchi or wheezes  CV: regular rate and rhythm, normal S1 S2, no S3 or S4, no murmur, click or rub, no peripheral edema and peripheral pulses strong  ABDOMEN: soft, nontender, no hepatosplenomegaly, no masses and bowel sounds normal  MS: no gross musculoskeletal defects noted, no edema  NEURO: Normal strength and tone, mentation intact and speech normal  PSYCH: mentation appears normal, affect normal/bright    Orders Placed This Encounter   Procedures    REVIEW OF HEALTH MAINTENANCE PROTOCOL ORDERS    Adult Sleep Eval & Management  Referral    General PFT Lab (Please always keep checked)            Kaylee Szymanski MD              "

## 2023-12-12 ENCOUNTER — MYC MEDICAL ADVICE (OUTPATIENT)
Dept: FAMILY MEDICINE | Facility: CLINIC | Age: 28
End: 2023-12-12
Payer: COMMERCIAL

## 2023-12-12 DIAGNOSIS — J45.990 EXERCISE-INDUCED ASTHMA: Primary | ICD-10-CM

## 2023-12-12 NOTE — TELEPHONE ENCOUNTER
Routing [employer] message to PCP      Pt is baylee for lung testing.  He has an appointment with sleep medicine 1/26.  I do not see any other orders that were placed.    In your last visit note on 9/14    Exercise-induced asthma  Use albuterol inh as needed  - General PFT Lab (Please always keep checked); Future    Unsure if PFT is something that is default for your asthma patients or if this is something you actually want ordered.        Yamilet, RN    Triage Nurse  [facility]

## 2023-12-21 ENCOUNTER — OFFICE VISIT (OUTPATIENT)
Dept: NURSING | Facility: CLINIC | Age: 28
End: 2023-12-21
Payer: COMMERCIAL

## 2023-12-21 VITALS — OXYGEN SATURATION: 99 % | BODY MASS INDEX: 28.57 KG/M2 | HEART RATE: 66 BPM | WEIGHT: 225 LBS

## 2023-12-21 DIAGNOSIS — J45.990 EXERCISE-INDUCED ASTHMA: ICD-10-CM

## 2023-12-21 PROCEDURE — 94729 DIFFUSING CAPACITY: CPT | Performed by: INTERNAL MEDICINE

## 2023-12-21 PROCEDURE — 94060 EVALUATION OF WHEEZING: CPT | Performed by: INTERNAL MEDICINE

## 2023-12-21 PROCEDURE — 94726 PLETHYSMOGRAPHY LUNG VOLUMES: CPT | Performed by: INTERNAL MEDICINE

## 2023-12-26 LAB
DLCOUNC-%PRED-PRE: 102 %
DLCOUNC-PRE: 38.37 ML/MIN/MMHG
DLCOUNC-PRED: 37.28 ML/MIN/MMHG
ERV-%PRED-PRE: 68 %
ERV-PRE: 1.42 L
ERV-PRED: 2.06 L
EXPTIME-PRE: 7.6 SEC
FEF2575-%PRED-POST: 82 %
FEF2575-%PRED-PRE: 52 %
FEF2575-POST: 3.97 L/SEC
FEF2575-PRE: 2.53 L/SEC
FEF2575-PRED: 4.79 L/SEC
FEFMAX-%PRED-PRE: 77 %
FEFMAX-PRE: 8.77 L/SEC
FEFMAX-PRED: 11.3 L/SEC
FEV1-%PRED-PRE: 76 %
FEV1-PRE: 3.68 L
FEV1FEV6-PRE: 70 %
FEV1FEV6-PRED: 83 %
FEV1FVC-PRE: 69 %
FEV1FVC-PRED: 83 %
FEV1SVC-PRE: 65 %
FEV1SVC-PRED: 83 %
FIFMAX-PRE: 6.48 L/SEC
FRCPLETH-%PRED-PRE: 70 %
FRCPLETH-PRE: 2.54 L
FRCPLETH-PRED: 3.61 L
FVC-%PRED-PRE: 91 %
FVC-PRE: 5.31 L
FVC-PRED: 5.82 L
IC-%PRED-PRE: 102 %
IC-PRE: 4.22 L
IC-PRED: 4.13 L
RVPLETH-%PRED-PRE: 58 %
RVPLETH-PRE: 1.11 L
RVPLETH-PRED: 1.92 L
TLCPLETH-%PRED-PRE: 83 %
TLCPLETH-PRE: 6.75 L
TLCPLETH-PRED: 8.04 L
VA-%PRED-PRE: 91 %
VA-PRE: 7.09 L
VC-%PRED-PRE: 97 %
VC-PRE: 5.64 L
VC-PRED: 5.79 L

## 2024-01-06 DIAGNOSIS — J45.990 EXERCISE-INDUCED ASTHMA: Primary | ICD-10-CM

## 2024-01-17 ENCOUNTER — PATIENT OUTREACH (OUTPATIENT)
Dept: CARE COORDINATION | Facility: CLINIC | Age: 29
End: 2024-01-17
Payer: COMMERCIAL

## 2024-01-18 ASSESSMENT — SLEEP AND FATIGUE QUESTIONNAIRES
HOW LIKELY ARE YOU TO NOD OFF OR FALL ASLEEP IN A CAR, WHILE STOPPED FOR A FEW MINUTES IN TRAFFIC: WOULD NEVER DOZE
HOW LIKELY ARE YOU TO NOD OFF OR FALL ASLEEP WHILE SITTING AND TALKING TO SOMEONE: WOULD NEVER DOZE
HOW LIKELY ARE YOU TO NOD OFF OR FALL ASLEEP WHILE LYING DOWN TO REST IN THE AFTERNOON WHEN CIRCUMSTANCES PERMIT: MODERATE CHANCE OF DOZING
HOW LIKELY ARE YOU TO NOD OFF OR FALL ASLEEP WHILE SITTING AND READING: MODERATE CHANCE OF DOZING
HOW LIKELY ARE YOU TO NOD OFF OR FALL ASLEEP WHILE SITTING INACTIVE IN A PUBLIC PLACE: WOULD NEVER DOZE
HOW LIKELY ARE YOU TO NOD OFF OR FALL ASLEEP WHILE WATCHING TV: WOULD NEVER DOZE
HOW LIKELY ARE YOU TO NOD OFF OR FALL ASLEEP WHEN YOU ARE A PASSENGER IN A CAR FOR AN HOUR WITHOUT A BREAK: SLIGHT CHANCE OF DOZING
HOW LIKELY ARE YOU TO NOD OFF OR FALL ASLEEP WHILE SITTING QUIETLY AFTER LUNCH WITHOUT ALCOHOL: SLIGHT CHANCE OF DOZING

## 2024-01-19 ENCOUNTER — VIRTUAL VISIT (OUTPATIENT)
Dept: SLEEP MEDICINE | Facility: CLINIC | Age: 29
End: 2024-01-19
Attending: FAMILY MEDICINE
Payer: COMMERCIAL

## 2024-01-19 VITALS — WEIGHT: 220 LBS | HEIGHT: 75 IN | BODY MASS INDEX: 27.35 KG/M2

## 2024-01-19 DIAGNOSIS — E66.3 OVERWEIGHT (BMI 25.0-29.9): ICD-10-CM

## 2024-01-19 DIAGNOSIS — R40.0 DAYTIME SOMNOLENCE: ICD-10-CM

## 2024-01-19 DIAGNOSIS — G25.81 RESTLESS LEGS SYNDROME (RLS): ICD-10-CM

## 2024-01-19 DIAGNOSIS — Z82.0 FAMILY HISTORY OF SLEEP APNEA: ICD-10-CM

## 2024-01-19 DIAGNOSIS — J45.990 EXERCISE-INDUCED ASTHMA: ICD-10-CM

## 2024-01-19 DIAGNOSIS — G47.9 SLEEP DISTURBANCE: Primary | ICD-10-CM

## 2024-01-19 DIAGNOSIS — K21.9 GASTROESOPHAGEAL REFLUX DISEASE WITHOUT ESOPHAGITIS: ICD-10-CM

## 2024-01-19 DIAGNOSIS — G47.00 INSOMNIA, UNSPECIFIED TYPE: ICD-10-CM

## 2024-01-19 PROCEDURE — 99205 OFFICE O/P NEW HI 60 MIN: CPT | Mod: 95 | Performed by: NURSE PRACTITIONER

## 2024-01-19 RX ORDER — ZOLPIDEM TARTRATE 10 MG/1
TABLET ORAL
Qty: 1 TABLET | Refills: 0 | Status: SHIPPED | OUTPATIENT
Start: 2024-01-19

## 2024-01-19 ASSESSMENT — PAIN SCALES - GENERAL: PAINLEVEL: NO PAIN (0)

## 2024-01-19 NOTE — PATIENT INSTRUCTIONS
"          MY TREATMENT INFORMATION FOR SLEEP APNEA-  Raj Smith    DOCTOR : RIVERA Dowd CNP    Am I having a sleep study at a sleep center?  --->Due to normal delays, you will be contacted within 2-4 weeks to schedule    Am I having a home sleep study?  --->Watch the video for the device you are using:    -/drop off device-   https://www.[employer].com/watch?v=yGGFBdELGhk    -Disposable device sent out require phone/computer application-   https://www.[employer].com/watch?v=BCce_vbiwxE      Frequently asked questions:  1. What is Obstructive Sleep Apnea (JAVIER)? JAVIER is the most common type of sleep apnea. Apnea means, \"without breath.\"  Apnea is most often caused by narrowing or collapse of the upper airway as muscles relax during sleep.   Almost everyone has occasional apneas. Most people with sleep apnea have had brief interruptions at night frequently for many years.  The severity of sleep apnea is related to how frequent and severe the events are.   2. What are the consequences of JAVIER? Symptoms include: feeling sleepy during the day, snoring loudly, gasping or stopping of breathing, trouble sleeping, and occasionally morning headaches or heartburn at night.  Sleepiness can be serious and even increase the risk of falling asleep while driving. Other health consequences may include development of high blood pressure and other cardiovascular disease in persons who are susceptible. Untreated JAVIER  can contribute to heart disease, stroke and diabetes.   3. What are the treatment options? In most situations, sleep apnea is a lifelong disease that must be managed with daily therapy. Medications are not effective for sleep apnea and surgery is generally not considered until other therapies have been tried. Your treatment is your choice . Continuous Positive Airway (CPAP) works right away and is the therapy that is effective in nearly everyone. An oral device to hold your jaw forward is usually the next " most reliable option. Other options include postioning devices (to keep you off your back), weight loss, and surgery including a tongue pacing device. There is more detail about some of these options below.  4. Are my sleep studies covered by insurance? Although we will request verification of coverage, we advise you also check in advance of the study to ensure there is coverage.    Important tips for those choosing CPAP and similar devices  For new devices, sign up for device SHANNON to monitor your device for your followup visits  We encourage you to utilize the [employer] shannon or website (myAir web (resmed.com) ) to monitor your therapy progress and share the data with your healthcare team when you discuss your sleep apnea.                                                    Know your equipment:  CPAP is continuous positive airway pressure that prevents obstructive sleep apnea by keeping the throat from collapsing while you are sleeping. In most cases, the device is  smart  and can slowly self-adjusts if your throat collapses and keeps a record every day of how well you are treated-this information is available to you and your care team.  BPAP is bilevel positive airway pressure that keeps your throat open and also assists each breath with a pressure boost to maintain adequate breathing.  Special kinds of BPAP are used in patients who have inadequate breathing from lung or heart disease. In most cases, the device is  smart  and can slowly self-adjusts to assist breathing. Like CPAP, the device keeps a record of how well you are treated.  Your mask is your connection to the device. You get to choose what feels most comfortable and the staff will help to make sure if fits. Here: are some examples of the different masks that are available: Magnetic mask aids may assist with use but there are safety issues that should be addressed when considering with magnets* ( see end of discussion).       Key points to remember on  your journey with sleep apnea:  Sleep study.  PAP devices often need to be adjusted during a sleep study to show that they are effective and adjusted right.  Good tips to remember: Try wearing just the mask during a quiet time during the day so your body adapts to wearing it. A humidifier is recommended for comfort in most cases to prevent drying of your nose and throat. Allergy medication from your provider may help you if you are having nasal congestion.  Getting settled-in. It takes more than one night for most of us to get used to wearing a mask. Try wearing just the mask during a quiet time during the day so your body adapts to wearing it. A humidifier is recommended for comfort in most cases. Our team will work with you carefully on the first day and will be in contact within 4 days and again at 2 and 4 weeks for advice and remote device adjustments. Your therapy is evaluated by the device each day.   Use it every night. The more you are able to sleep naturally for 7-8 hours, the more likely you will have good sleep and to prevent health risks or symptoms from sleep apnea. Even if you use it 4 hours it helps. Occasionally all of us are unable to use a medical therapy, in sleep apnea, it is not dangerous to miss one night.   Communicate. Call our skilled team on the number provided on the first day if your visit for problems that make it difficult to wear the device. Over 2 out of 3 patients can learn to wear the device long-term with help from our team. Remember to call our team or your sleep providers if you are unable to wear the device as we may have other solutions for those who cannot adapt to mask CPAP therapy. It is recommended that you sleep your sleep provider within the first 3 months and yearly after that if you are not having problems.   Use it for your health. We encourage use of CPAP masks during daytime quiet periods to allow your face and brain to adapt to the sensation of CPAP so that it will  be a more natural sensation to awaken to at night or during naps. This can be very useful during the first few weeks or months of adapting to CPAP though it does not help medically to wear CPAP during wakefulness and  should not be used as a strategy just to meet guidelines.  Take care of your equipment. Make sure you clean your mask and tubing using directions every day and that your filter and mask are replaced as recommended or if they are not working.     *Masks with magnets:  Updated Contraindications  Masks with magnetic components are contraindicated for use by patients where they, or anyone in close physical contact while using the mask, have the following:   Active medical implants that interact with magnets (i.e., pacemakers, implantable cardioverter defibrillators (ICD), neurostimulators, cerebrospinal fluid (CSF) shunts, insulin/infusion pumps)   Metallic implants/objects containing ferromagnetic material (i.e., aneurysm clips/flow disruption devices, embolic coils, stents, valves, electrodes, implants to restore hearing or balance with implanted magnets, ocular implants, metallic splinters in the eye)  Updated Warning  Keep the mask magnets at a safe distance of at least 6 inches (150 mm) away from implants or medical devices that may be adversely affected by magnetic interference. This warning applies to you or anyone in close physical contact with your mask. The magnets are in the frame and lower headgear clips, with a magnetic field strength of up to 400mT. When worn, they connect to secure the mask but may inadvertently detach while asleep.  Implants/medical devices, including those listed within contraindications, may be adversely affected if they change function under external magnetic fields or contain ferromagnetic materials that attract/repel to magnetic fields (some metallic implants, e.g., contact lenses with metal, dental implants, metallic cranial plates, screws, bimal hole covers, and bone  substitute devices). Consult your physician and  of your implant / other medical device for information on the potential adverse effects of magnetic fields.    BESIDES CPAP, WHAT OTHER THERAPIES ARE THERE?    Positioning Device  Positioning devices are generally used when sleep apnea is mild and only occurs on your back.This example shows a pillow that straps around the waist. It may be appropriate for those whose sleep study shows milder sleep apnea that occurs primarily when lying flat on one's back. Preliminary studies have shown benefit but effectiveness at home may need to be verified by a home sleep test. These devices are generally not covered by medical insurance.  Examples of devices that maintain sleeping on the back to prevent snoring and mild sleep apnea.    Belt type body positioner  http://Collaaj/    Electronic reminder  [email or web address].com/            Oral Appliance  What is oral appliance therapy?  An oral appliance device fits on your teeth at night like a retainer used after having braces. The device is made by a specialized dentist and requires several visits over 1-2 months before a manufactured device is made to fit your teeth and is adjusted to prevent your sleep apnea. Once an oral device is working properly, snoring should be improved. A home sleep test may be recommended at that time if to determine whether the sleep apnea is adequately treated.       Some things to remember:  -Oral devices are often, but not always, covered by your medical insurance. Be sure to check with your insurance provider.   -If you are referred for oral therapy, you will be given a list of specialized dentists to consider or you may choose to visit the Web site of the American Academy of Dental Sleep Medicine  -Oral devices are less likely to work if you have severe sleep apnea or are extremely overweight.     More detailed information  An oral appliance is a small acrylic device that fits  over the upper and lower teeth  (similar to a retainer or a mouth guard). This device slightly moves jaw forward, which moves the base of the tongue forward, opens the airway, improves breathing for effective treat snoring and obstructive sleep apnea in perhaps 7 out of 10 people .  The best working devices are custom-made by a dental device  after a mold is made of the teeth 1, 2, 3.  When is an oral appliance indicated?  Oral appliance therapy is recommended as a first-line treatment for patients with primary snoring, mild sleep apnea, and for patients with moderate sleep apnea who prefer appliance therapy to use of CPAP4, 5. Severity of sleep apnea is determined by sleep testing and is based on the number of respiratory events per hour of sleep.   How successful is oral appliance therapy?  The success rate of oral appliance therapy in patients with mild sleep apnea is 75-80% while in patients with moderate sleep apnea it is 50-70%. The chance of success in patients with severe sleep apnea is 40-50%. The research also shows that oral appliances have a beneficial effect on the cardiovascular health of JAVIER patients at the same magnitude as CPAP therapy7.  Oral appliances should be a second-line treatment in cases of severe sleep apnea, but if not completely successful then a combination therapy utilizing CPAP plus oral appliance therapy may be effective. Oral appliances tend to be effective in a broad range of patients although studies show that the patients who have the highest success are females, younger patients, those with milder disease, and less severe obesity. 3, 6.   Finding a dentist that practices dental sleep medicine  Specific training is available through the American Academy of Dental Sleep Medicine for dentists interested in working in the field of sleep. To find a dentist who is educated in the field of sleep and the use of oral appliances, near you, visit the Web site of the American  Academy of Dental Sleep Medicine.    References  1. Rima et al. Objectively measured vs self-reported compliance during oral appliance therapy for sleep-disordered breathing. Chest 2013; 144(5): 4725-1651.  2. Delroy et al. Objective measurement of compliance during oral appliance therapy for sleep-disordered breathing. Thorax 2013; 68(1): 91-96.  3. Vlad, et al. Mandibular advancement devices in 620 men and women with JAVIER and snoring: tolerability and predictors of treatment success. Chest 2004; 125: 4129-3976.  4. Armando et al. Oral appliances for snoring and JAVIER: a review. Sleep 2006; 29: 244-262.  5. Vianey et al. Oral appliance treatment for JAVIER: an update. J Clin Sleep Med 2014; 10(2): 215-227.  6. Aby et al. Predictors of OSAH treatment outcome. J Dent Res 2007; 86: 3583-1617.      Weight Loss:    Weight loss is a long-term strategy that may improve sleep apnea in some patients.    Weight management is a personal decision and the decision should be based on your interest and the potential benefits.  If you are interested in exploring weight loss strategies, the following discussion covers the impact on weight loss on sleep apnea and the approaches that may be successful.    Being overweight does not necessarily mean you will have health consequences.  Those who have BMI over 35 or over 27 with existing medical conditions carries greater risk.   Weight loss decreases severity of sleep apnea in most people with obesity. For those with mild obesity who have developed snoring with weight gain, even 15-30 pound weight loss can improve and occasionally eliminate sleep apnea.  Structured and life-long dietary and health habits are necessary to lose weight and keep healthier weight levels.     Though there may be significant health benefits from weight loss, long-term weight loss is very difficult to achieve- studies show success with dietary management in less than 10% of people. In  addition, substantial weight loss may require years of dietary control and may be difficult if patients have severe obesity. In these cases, surgical management may be considered.  Finally, older individuals who have tolerated obesity without health complications may be less likely to benefit from weight loss strategies.      Your BMI is Body mass index is 27.5 kg/m .    What is BMI?  Body mass index (BMI) is one way to tell whether you are at a healthy weight, overweight, or obese. It measures your weight in relation to your height.  A BMI of 18.5 to 24.9 is in the healthy range. A person with a BMI of 25 to 29.9 is considered overweight, and someone with a BMI of 30 or greater is considered obese.  Another way to find out if you are at risk for health problems caused by overweight and obesity is to measure your waist. If you are a woman and your waist is more than 35 inches, or if you are a man and your waist is more than 40 inches, your risk of disease may be higher.  More than two-thirds of American adults are considered overweight or obese. Being overweight or obese increases the risk for further weight gain.  Excess weight may lead to heart disease and diabetes. Creating and following plans for healthy eating and physical activity may help you improve your health.    Methods for maintaining or losing weight.  Weight control is part of healthy lifestyle and includes exercise, emotional health, and healthy eating habits.  Careful eating habits lifelong is the mainstay of weight control.  Though there are significant health benefits from weight loss, long-term weight loss with diet alone may be very difficult to achieve- studies show long-term success with dietary management in less than 10% of people. Attaining a healthy weight may be especially difficult to achieve in those with severe obesity. In some cases, medications, devices and surgical management might be considered.    What can you do?  If you are  overweight or obese and are interested in methods for weight loss, you should discuss this with your provider. In addition, we recommend that you review healthy life styles and methods for weight loss available through the National Institutes of Health patient information sites:   http://win.niddk.nih.gov/publications/index.htm    Surgery:    Surgery for obstructive sleep apnea is considered generally only when other therapies fail to work. Surgery may be discussed with you if you are having a difficult time tolerating CPAP and or when there is an abnormal structure that requires surgical correction.  Nose and throat surgeries often enlarge the airway to prevent collapse.  Most of these surgeries create pain for 1-2 weeks and up to half of the most common surgeries are not effective throughout life.  You should carefully discuss the benefits and drawbacks to surgery with your sleep provider and surgeon to determine if it is the best solution for you.   More information  Surgery for JAVIER is directed at areas that are responsible for narrowing or complete obstruction of the airway during sleep.  There are a wide range of procedures available to enlarge and/or stabilize the airway to prevent blockage of breathing in the three major areas where it can occur: the palate, tongue, and nasal regions.  Successful surgical treatment depends on the accurate identification of the factors responsible for obstructive sleep apnea in each person.  A personalized approach is required because there is no single treatment that works well for everyone.  Because of anatomic variation, consultation with an examination by a sleep surgeon is a critical first step in determining what surgical options are best for each patient.  In some cases, examination during sedation may be recommended in order to guide the selection of procedures.  Patients will be counseled about risks and benefits as well as the typical recovery course after surgery.  Surgery is typically not a cure for a person s JAVIER.  However, surgery will often significantly improve one s JAVIER severity (termed  success rate ).  Even in the absence of a cure, surgery will decrease the cardiovascular risk associated with OSA7; improve overall quality of life8 (sleepiness, functionality, sleep quality, etc).      Palate Procedures:  Patients with JAVIER often have narrowing of their airway in the region of their tonsils and uvula.  The goals of palate procedures are to widen the airway in this region as well as to help the tissues resist collapse.  Modern palate procedure techniques focus on tissue conservation and soft tissue rearrangement, rather than tissue removal.  Often the uvula is preserved in this procedure. Residual sleep apnea is common in patient after pharyngoplasty with an average reduction in sleep apnea events of 33%2.      Tongue Procedures:  ExamWhile patients are awake, the muscles that surround the throat are active and keep this region open for breathing. These muscles relax during sleep, allowing the tongue and other structures to collapse and block breathing.  There are several different tongue procedures available.  Selection of a tongue base procedure depends on characteristics seen on physical exam.  Generally, procedures are aimed at removing bulky tissues in this area or preventing the back of the tongue from falling back during sleep.  Success rates for tongue surgery range from 50-62%3.    Hypoglossal Nerve Stimulation:  Hypoglossal nerve stimulation has recently received approval from the [place] Food and Drug Administration for the treatment of obstructive sleep apnea.  This is based on research showing that the system was safe and effective in treating sleep apnea6.  Results showed that the median AHI score decreased 68%, from 29.3 to 9.0. This therapy uses an implant system that senses breathing patterns and delivers mild stimulation to airway muscles, which  keeps the airway open during sleep.  The system consists of three fully implanted components: a small generator (similar in size to a pacemaker), a breathing sensor, and a stimulation lead.  Using a small handheld remote, a patient turns the therapy on before bed and off upon awakening.    Candidates for this device must be greater than 18 years of age, have moderate to severe JAVIER (AHI between 15-65), BMI less than 35, have tried CPAP/oral appliance for at least 8 weeks without success, and have appropriate upper airway anatomy (determined by a sleep endoscopy performed by Dr. Pete Alvarez).    Hypoglossal Nerve Stimulation Pathway:    The sleep surgeon s office will work with the patient through the insurance prior-authorization process (including communications and appeals).    Nasal Procedures:  Nasal obstruction can interfere with nasal breathing during the day and night.  Studies have shown that relief of nasal obstruction can improve the ability of some patients to tolerate positive airway pressure therapy for obstructive sleep apnea1.  Treatment options include medications such as nasal saline, topical corticosteroid and antihistamine sprays, and oral medications such as antihistamines or decongestants. Non-surgical treatments can include external nasal dilators for selected patients. If these are not successful by themselves, surgery can improve the nasal airway either alone or in combination with these other options.      Combination Procedures:  Combination of surgical procedures and other treatments may be recommended, particularly if patients have more than one area of narrowing or persistent positional disease.  The success rate of combination surgery ranges from 66-80%2,3.    References  Balbir BLUNT. The Role of the Nose in Snoring and Obstructive Sleep Apnoea: An Update.  Eur Arch Otorhinolaryngol. 2011; 268: 1365-73.   Jennifer SM; Lisa JA; Ty PLUMMER; Pallanch JF; Caitlin MA; Boris ZAMUDIO; Jeffrey SEAY.  Surgical modifications of the upper airway for obstructive sleep apnea in adults: a systematic review and meta-analysis. SLEEP 2010;33(10):7449-7927. Chandra SANTOYO. Hypopharyngeal surgery in obstructive sleep apnea: an evidence-based medicine review.  Arch Otolaryngol Head Neck Surg. 2006 Feb;132(2):206-13.  Demarco YH1, Jeny Y, Davie WENDY. The efficacy of anatomically based multilevel surgery for obstructive sleep apnea. Otolaryngol Head Neck Surg. 2003 Oct;129(4):327-35.  Kezirian E, Goldberg A. Hypopharyngeal Surgery in Obstructive Sleep Apnea: An Evidence-Based Medicine Review. Arch Otolaryngol Head Neck Surg. 2006 Feb;132(2):206-13.  Stephen WALSH et al. Upper-Airway Stimulation for Obstructive Sleep Apnea.  N Engl J Med. 2014 Jan 9;370(2):139-49.  Renetta Y et al. Increased Incidence of Cardiovascular Disease in Middle-aged Men with Obstructive Sleep Apnea. Am J Respir Crit Care Med; 2002 166: 159-165  Leos EM et al. Studying Life Effects and Effectiveness of Palatopharyngoplasty (SLEEP) study: Subjective Outcomes of Isolated Uvulopalatopharyngoplasty. Otolaryngol Head Neck Surg. 2011; 144: 623-631.        WHAT IF I ONLY HAVE SNORING?    Mandibular advancement devices, lateral sleep positioning, long-term weight loss and treatment of nasal allergies have been shown to improve snoring.  Exercising tongue muscles with a game (https://[employer]/us/shannon/soundly-reduce-snoring/pv5741421683) or stimulating the tongue during the day with a device (https://doi.org/10.3390/vum23386731) have improved snoring in some individuals.    Remember to Drive Safe... Drive Alive     Sleep health profoundly affects your health, mood, and your safety.  Thirty three percent of the population (one in three of us) is not getting enough sleep and many have a sleep disorder. Not getting enough sleep or having an untreated / undertreated sleep condition may make us sleepy without even knowing it. In fact, our driving could be dramatically  impaired due to our sleep health. As your provider, here are some things I would like you to know about driving:     Here are some warning signs for impairment and dangerous drowsy driving:              -Having been awake more than 16 hours               -Looking tired               -Eyelid drooping              -Head nodding (it could be too late at this point)              -Driving for more than 30 minutes     Some things you could do to make the driving safer if you are experiencing some drowsiness:              -Stop driving and rest              -Call for transportation              -Make sure your sleep disorder is adequately treated     Some things that have been shown NOT to work when experiencing drowsiness while driving:              -Turning on the radio              -Opening windows              -Eating any  distracting  /  entertaining  foods (e.g., sunflower seeds, candy, or any other)              -Talking on the phone      Your decision may not only impact your life, but also the life of others. Please, remember to drive safe for yourself and all of us.

## 2024-01-19 NOTE — PROGRESS NOTES
Virtual Visit Details    Type of service:  Video Visit     Originating Location (pt. Location): Home    Distant Location (provider location):  Off-site  Platform used for Video Visit: [facility]      Outpatient Sleep Medicine Consultation:      Name: Raj Smith MRN# [id number]   Age: 28 year old YOB: 1995     Date of Consultation: January 19, 2024  Consultation is requested by: Kaylee Szymanski MD  [address] [place] [address] Kaylee Szymanski  Primary care provider: Kaylee Szymanski       Reason for Sleep Consult:     Raj Smith is sent by Kaylee Szymanski for a sleep consultation regarding possible sleep apnea.    Patient s Reason for visit  Raj Smith main reason for visit: Family history of sleep apnea. Two years of Fitbit giving me horrible scores every night.  Patient states problem(s) started: Not sure  Raj Smith's goals for this visit: Sleep apnea, do I need to be concerned about bad sleep scores?           Assessment and Plan:     Summary Sleep Diagnoses:  1. Sleep disturbance  2. Daytime somnolence  3. Insomnia, unspecified type  4. Restless legs syndrome (RLS)  5. Family history of sleep apnea  6. Exercise-induced asthma  7. Gastroesophageal reflux disease without esophagitis  8. Overweight (BMI 25.0-29.9)  - Adult Sleep Eval & Management  Referral  - Comprehensive Sleep Study; Future  - zolpidem (AMBIEN) 10 MG tablet; Take tablet by mouth 15 minutes prior to sleep, for Sleep Study  Dispense: 1 tablet; Refill: 0    Comorbid Diagnoses:  1.  Exercise-induced asthma  2.  GERD      Summary Recommendations:  1.  Recommend further evaluation with diagnostic in-lab polysomnography (PSG) for possible sleep disordered breathing and restless leg syndrome/PLMD.  His STOP-BANG score is 2 today which suggest a low risk of JAVIER.  His symptoms, however, do suggest possible obstructive sleep apnea.  2.  We discussed the pathophysiology of obstructive sleep apnea  and the risks associated with untreated JAVIER.  We also discussed the pros and cons of in lab polysomnography versus home sleep testing.  The patient has elected to undergo in lab polysomnography (PSG) to further evaluate his symptoms of possible obstructive sleep apnea and RLS/PLMD.  3.  All potential therapeutic options including positive airway pressure, mandibular advancing oral appliances, positional therapy, and surgical options were discussed. Also counseled about impact of weight loss on JAVIER.   4.  His insomnia severity index is 10 today which is consistent with mild clinical insomnia.  This appears to be associated with elements of poor sleep hygiene, an active mind at bedtime, young children in the home, anxiety, restless legs, and possible sleep apnea contributing. Information provided regarding sleep hygiene guidelines in the AVS for his review.  5.  A Rx for zolpidem 10 mg by mouth at bedtime x 1 for the night of the sleep study was sent to his pharmacy today. He was instructed to bring it with him the night of the sleep study.  6.  Follow-up in approximately 2 weeks after the sleep study to review the results and to determine next steps.    Orders Placed This Encounter   Procedures    Comprehensive Sleep Study         Summary Counseling:    Sleep Testing Reviewed  Obstructive Sleep Apnea Reviewed  Complications of Untreated Sleep Apnea Reviewed  Previous recent chart notes, lab, imaging, PFT results reviewed    Medical Decision-making:   Educational materials provided in instructions    Total time spent reviewing medical records, history and physical examination, review of previous testing and interpretation as well as documentation on this date:70 minutes    CC: Kaylee Szymanski          History of Present Illness:     Raj stein is a 28-year-old male with a PMH pertinent for exercise-induced asthma, GERD and's today with symptoms of poor sleep as viewed through data on his FitBit wearable,  difficulty falling asleep, daytime somnolence/tiredness, waking with reflux/heartburn, and poorly restful sleep for several years. He was referred by his PCP for further evaluation of possible sleep disordered breathing.    Past Sleep Evaluations: No    SLEEP-WAKE SCHEDULE:     Work/School Days: Patient goes to school/work: Yes   Usually gets into bed at 11:30 pm  Takes patient about An hour or more to fall asleep  Has trouble falling asleep 4/5 nights per week  Wakes up in the middle of the night 1 times.  Wakes up due to Use the bathroom;Anxiety  He has trouble falling back asleep None times a week.   It usually takes Depends when I wake up, if night I struggle to go to go back to sleep. In morning I have no issues. to get back to sleep  Patient is usually up at 7/8  Uses alarm: Yes    Weekends/Non-work Days/All Other Days:  Usually gets into bed at 12:30   Takes patient about 20 minutes to fall asleep  Patient is usually up at 8 am  Uses alarm: No    Sleep Need  Patient gets  7-8 sleep on average   Patient thinks he needs about 8.5 sleep    Raj Smith prefers to sleep in this position(s): Back;Head Elevated   Patient states they do the following activities in bed: Read    Naps  Patient takes a purposeful nap None times a week and naps are usually 15 minutes in duration  He feels better after a nap: Yes  He dozes off unintentionally 0 days per week  Patient has had a driving accident or near-miss due to sleepiness/drowsiness: No      SLEEP DISRUPTIONS:    Breathing/Snoring  Patient snores:No  Other people complain about his snoring: No  Patient has been told he stops breathing in his sleep:No  He has issues with the following: Morning headaches;Stuffy nose when you wake up;Heartburn or reflux at night    Movement:  Patient gets pain, discomfort, with an urge to move:  No - reports RLS occasionally  It happens when he is resting:  No  It happens more at night:  No  Patient has been told he kicks his legs at  night:  Yes     Behaviors in Sleep:  Raj Smith has experienced the following behaviors while sleeping: Sleep-talking;Teeth grinding;Night terrors (screaming,yelling or acting afraid but not recalling event) - wears mouthguard  He has experienced sudden muscle weakness during the day: No      Is there anything else you would like your sleep provider to know: Have a couple month old baby so Im not exactly on my normal routine :)      CAFFEINE AND OTHER SUBSTANCES:    Patient consumes caffeinated beverages per day:  3  Last caffeine use is usually: 1:00 pm  List of any prescribed or over the counter stimulants that patient takes: Steriods as of this week for asthma  List of any prescribed or over the counter sleep medication patient takes: No  List of previous sleep medications that patient has tried: No  Patient drinks alcohol to help them sleep: No  Patient drinks alcohol near bedtime: Yes    Alcohol use: drinks 2-3 per day on weekends, maybe 1-2 per day during week  Nicotine/tobacco use: None  Recreational drug use: None      Family History:  Patient has a family member been diagnosed with a sleep disorder: Yes  Sleep apnea - Dad, PGF, Paternal uncle         SCALES:    EPWORTH SLEEPINESS SCALE         1/18/2024     2:51 PM    Empire Sleepiness Scale ( RUBENS Merrill  [id number]<br>ESS - [place]/English - Final version - 21 Nov 07 - [facility] [place].)   Sitting and reading Moderate chance of dozing   Watching TV Would never doze   Sitting, inactive in a public place (e.g. a theatre or a meeting) Would never doze   As a passenger in a car for an hour without a break Slight chance of dozing   Lying down to rest in the afternoon when circumstances permit Moderate chance of dozing   Sitting and talking to someone Would never doze   Sitting quietly after a lunch without alcohol Slight chance of dozing   In a car, while stopped for a few minutes in traffic Would never doze   Empire Score (MC) 6   Empire Score  "(Sleep) 6         INSOMNIA SEVERITY INDEX (SUMEET)          1/18/2024     2:41 PM   Insomnia Severity Index (SUMEET)   Difficulty falling asleep 2   Difficulty staying asleep 1   Problems waking up too early 0   How SATISFIED/DISSATISFIED are you with your CURRENT sleep pattern? 3   How NOTICEABLE to others do you think your sleep problem is in terms of impairing the quality of your life? 1   How WORRIED/DISTRESSED are you about your current sleep problem? 2   To what extent do you consider your sleep problem to INTERFERE with your daily functioning (e.g. daytime fatigue, mood, ability to function at work/daily chores, concentration, memory, mood, etc.) CURRENTLY? 1   SUMEET Total Score 10       Guidelines for Scoring/Interpretation:  Total score categories:  0-7 = No clinically significant insomnia   8-14 = Subthreshold insomnia   15-21 = Clinical insomnia (moderate severity)  22-28 = Clinical insomnia (severe)  Used via courtesy of www.[employer].va.gov with permission from Gerald Lim PhD., [facility]      STOP BANG score: 2        1/19/2024     1:01 PM   STOP BANG Questionnaire (  2008, the American Society of Anesthesiologists, Inc. [employer]   BMI Clinic: 27.5         GAD7         No data to display                  CAGE-AID         No data to display                CAGE-AID reprinted with permission from the [place] Medical Journal, LAVONNE Hoskins and MERLE Simms, \"Conjoint screening questionnaires for alcohol and drug abuse\" [place] Medical Journal 94: 135-140, 1995.      PATIENT HEALTH QUESTIONNAIRE-9 (PHQ - 9)         No data to display                Developed by Yun Justice, Belgica Crump, Andrey Newman and colleagues, with an educational carolyn from [employer] No permission required to reproduce, translate, display or distribute.        Allergies:    No Known Allergies    Medications:    Current Outpatient Medications   Medication Sig Dispense Refill    " albuterol (PROAIR HFA/PROVENTIL HFA/VENTOLIN HFA) 108 (90 Base) MCG/ACT inhaler Inhale 2 puffs into the lungs every 6 hours as needed for shortness of breath, wheezing or cough 36 g 3    beclomethasone HFA (QVAR REDIHALER) 80 MCG/ACT inhaler Inhale 1 puff into the lungs 2 times daily 10.6 g 11    cetirizine (ZYRTEC) 10 MG tablet Take 1 tablet (10 mg) by mouth daily 90 tablet 3    omeprazole (PRILOSEC) 20 MG DR capsule Take 1 capsule (20 mg) by mouth daily 90 capsule 3    zolpidem (AMBIEN) 10 MG tablet Take tablet by mouth 15 minutes prior to sleep, for Sleep Study 1 tablet 0       Problem List:  Patient Active Problem List    Diagnosis Date Noted    Gastroesophageal reflux disease without esophagitis 02/16/2023     Priority: Medium    Injury of head, subsequent encounter 02/06/2021     Priority: Medium        Past Medical/Surgical History:  Past Medical History:   Diagnosis Date    Gastroesophageal reflux disease without esophagitis 2/16/2023     Past Surgical History:   Procedure Laterality Date    COLONOSCOPY N/A 3/3/2021    Procedure: Colonoscopy, With Polypectomy And Biopsy;  Surgeon: Josemanuel Alexander MD;  Location: [facility] OR    COLONOSCOPY WITH CO2 INSUFFLATION N/A 3/3/2021    Procedure: COLONOSCOPY, WITH CO2 INSUFFLATION;  Surgeon: Josemanuel Alexander MD;  Location: [facility] OR    COMBINED ESOPHAGOSCOPY, GASTROSCOPY, DUODENOSCOPY (EGD) WITH CO2 INSUFFLATION N/A 3/3/2021    Procedure: ESOPHAGOGASTRODUODENOSCOPY, WITH CO2 INSUFFLATION;  Surgeon: Josemanuel Alexander MD;  Location: [facility] OR    ESOPHAGOSCOPY, GASTROSCOPY, DUODENOSCOPY (EGD), COMBINED N/A 3/3/2021    Procedure: Esophagogastroduodenoscopy, With Biopsy;  Surgeon: Josemanuel Alexander MD;  Location: [facility] OR    NO HISTORY OF SURGERY         Social History:  Social History     Socioeconomic History    Marital status:      Spouse name: Not on file    Number of children: 0    Years of education: Not on file    Highest education  level: Not on file   Occupational History    Occupation: [employer]   Tobacco Use    Smoking status: Never     Passive exposure: Never    Smokeless tobacco: Never   Vaping Use    Vaping Use: Never used   Substance and Sexual Activity    Alcohol use: Yes     Comment: 1 beer a day 5-7 drinks per week    Drug use: No    Sexual activity: Yes     Partners: Female   Other Topics Concern    Parent/sibling w/ CABG, MI or angioplasty before 65F 55M? No   Social History Narrative    Not on file     Social Determinants of Health     Financial Resource Strain: Not on file   Food Insecurity: Not on file   Transportation Needs: Not on file   Physical Activity: Not on file   Stress: Not on file   Social Connections: Not on file   Interpersonal Safety: Not on file   Housing Stability: Not on file       Family History:  Family History   Problem Relation Age of Onset    Asthma Brother     Other Cancer Maternal Grandmother         brain tumor    Other Cancer Maternal Grandfather         brain tumor    Diabetes No family hx of     Hypertension No family hx of     Prostate Cancer No family hx of     Colon Cancer No family hx of     Breast Cancer No family hx of        Review of Systems:  A complete review of systems reviewed by me is negative with the exeption of what has been mentioned in the history of present illness.  In the last TWO WEEKS have you experienced any of the following symptoms?  Fevers: No  Night Sweats: No  Weight Gain: Yes  Pain at Night: No  Double Vision: No  Changes in Vision: Yes  Difficulty Breathing through Nose: Yes  Sore Throat in Morning: Yes  Dry Mouth in the Morning: Yes  Shortness of Breath Lying Flat: Yes  Shortness of Breath With Activity: No  Awakening with Shortness of Breath: No  Increased Cough: Yes  Heart Racing at Night: Yes  Swelling in Feet or Legs: No  Diarrhea at Night: No  Heartburn at Night: Yes  Urinating More than Once at Night: No  Losing Control of Urine at Night: No  Joint Pains at Night:  "No  Headaches in Morning: Yes  Weakness in Arms or Legs: No  Depressed Mood: Yes  Anxiety: No     Physical Examination:  Vitals: Ht 1.905 m (6' 3\")   Wt 99.8 kg (220 lb)   BMI 27.50 kg/m    BMI= Body mass index is 27.5 kg/m .           GENERAL APPEARANCE: healthy, alert, no distress, and cooperative  RESP: Unlabored, easy breathing with normal conversational speech  NEURO: Alert and oriented x 3, mentation intact and speech normal  PSYCH: mentation appears normal and affect normal/bright  Mallampati Class: Unable to examine  Tonsillar Stage: Unable to examine         Data: All pertinent previous laboratory data reviewed     Recent Labs   Lab Test 03/07/22  1224      POTASSIUM 4.0   CHLORIDE 108   CO2 27   ANIONGAP 4   GLC 87   BUN 10   CR 0.93   LUIS 8.6       Recent Labs   Lab Test 09/27/16  1417   WBC 6.4   RBC 4.76   HGB 14.8   HCT 42.1   MCV 88   MCH 31.1   MCHC 35.2   RDW 12.8          Recent Labs   Lab Test 03/07/22  1224   PROTTOTAL 7.2   ALBUMIN 4.0   BILITOTAL 0.6   ALKPHOS 65   AST 13   ALT 21       No results found for: \"TSH\"    No results found for: \"UAMP\", \"UBARB\", \"BENZODIAZEUR\", \"UCANN\", \"UCOC\", \"OPIT\", \"UPCP\"    No results found for: \"IRONSAT\", \"[id number]\", \"SALTY\"    No results found for: \"PH\", \"PHARTERIAL\", \"PO2\", \"[id number]\", \"SAT\", \"PCO2\", \"HCO3\", \"BASEEXCESS\", \"BILL\", \"BEB\"    @LABRCNTIPR(phv:4,pco2v:4,po2v:4,hco3v:4,juan jose:4,o2per:4)@    Echocardiology: No results found for this or any previous visit (from the past 4320 hour(s)).    Chest x-ray: No results found for this or any previous visit from the past 365 days.      Chest CT: No results found for this or any previous visit from the past 365 days.      PFT: Most Recent [facility] Pulmonary Function Testing    FVC-Pred   Date Value Ref Range Status   12/21/2023 5.82 L      FVC-Pre   Date Value Ref Range Status   12/21/2023 5.31 L      FVC-%Pred-Pre   Date Value Ref Range Status   12/21/2023 91 %      FEV1-Pre   Date Value Ref Range " "Status   12/21/2023 3.68 L      FEV1-%Pred-Pre   Date Value Ref Range Status   12/21/2023 76 %      FEV1FVC-Pred   Date Value Ref Range Status   12/21/2023 83 %      FEV1FVC-Pre   Date Value Ref Range Status   12/21/2023 69 %      No results found for: \"20029\"  FEFMax-Pred   Date Value Ref Range Status   12/21/2023 11.30 L/sec      FEFMax-Pre   Date Value Ref Range Status   12/21/2023 8.77 L/sec      FEFMax-%Pred-Pre   Date Value Ref Range Status   12/21/2023 77 %      ExpTime-Pre   Date Value Ref Range Status   12/21/2023 7.60 sec      FIFMax-Pre   Date Value Ref Range Status   12/21/2023 6.48 L/sec      FEV1FEV6-Pred   Date Value Ref Range Status   12/21/2023 83 %      FEV1FEV6-Pre   Date Value Ref Range Status   12/21/2023 70 %      Narrative  Performed by: [facility] PFT  The FEV1 and FEV1/FVC ratio are reduced, but the FVC is within normal limits.  The inspiratory flow rates are reduced.  Lung volumes are reduced.  Following administration of bronchodilators, there is a significant response.  The diffusing capacity is  normal.  However, the diffusing capacity was not corrected for the patient's hemoglobin.    IMPRESSION:    Mixed Mild restriction with concurrent mild airflow obstruction.  There is a significant bronchodilator response.    Normal diffusing capacity.  The diffusing capacity was not corrected for the patient's hemoglobin.         Cedrick Correa, RIVERA CNP 1/19/2024   Sleep Medicine      This note was written with the assistance of the Dragon voice-dictation technology software. The final document, although reviewed, may contain errors. For corrections, please contact the office.          "

## 2024-01-19 NOTE — NURSING NOTE
Is the patient currently in the state of [place]? YES    Visit mode:VIDEO    If the visit is dropped, the patient can be reconnected by: VIDEO VISIT: Text to cell phone:   Telephone Information:   Mobile [phone]       Will anyone else be joining the visit? NO  (If patient encounters technical issues they should call [phone]    How would you like to obtain your AVS? MyChart    Are changes needed to the allergy or medication list? No    Reason for visit: Consult    Glenn RIVER

## 2024-01-31 ENCOUNTER — PATIENT OUTREACH (OUTPATIENT)
Dept: CARE COORDINATION | Facility: CLINIC | Age: 29
End: 2024-01-31
Payer: COMMERCIAL

## 2024-03-07 NOTE — PATIENT INSTRUCTIONS
Thanks for coming in today.  [facility]    [address] [facility], [address]      
What Is The Reason For Today's Visit?: History of Melanoma
Year Excised?: 2019
Breslow Depth?: 0.5mm

## 2024-05-04 ENCOUNTER — HEALTH MAINTENANCE LETTER (OUTPATIENT)
Age: 29
End: 2024-05-04

## 2024-05-09 ENCOUNTER — OFFICE VISIT (OUTPATIENT)
Dept: ALLERGY | Facility: CLINIC | Age: 29
End: 2024-05-09
Attending: FAMILY MEDICINE
Payer: COMMERCIAL

## 2024-05-09 ENCOUNTER — ANCILLARY PROCEDURE (OUTPATIENT)
Dept: GENERAL RADIOLOGY | Facility: CLINIC | Age: 29
End: 2024-05-09
Attending: ALLERGY & IMMUNOLOGY
Payer: COMMERCIAL

## 2024-05-09 VITALS
OXYGEN SATURATION: 98 % | TEMPERATURE: 97.5 F | HEART RATE: 50 BPM | HEIGHT: 75 IN | BODY MASS INDEX: 27.3 KG/M2 | SYSTOLIC BLOOD PRESSURE: 125 MMHG | WEIGHT: 219.6 LBS | DIASTOLIC BLOOD PRESSURE: 77 MMHG

## 2024-05-09 DIAGNOSIS — J45.40 MODERATE PERSISTENT ASTHMA WITHOUT COMPLICATION: ICD-10-CM

## 2024-05-09 DIAGNOSIS — J30.0 CHRONIC VASOMOTOR RHINITIS: ICD-10-CM

## 2024-05-09 DIAGNOSIS — K21.9 GASTROESOPHAGEAL REFLUX DISEASE WITHOUT ESOPHAGITIS: ICD-10-CM

## 2024-05-09 DIAGNOSIS — K21.00 GASTROESOPHAGEAL REFLUX DISEASE WITH ESOPHAGITIS, UNSPECIFIED WHETHER HEMORRHAGE: ICD-10-CM

## 2024-05-09 DIAGNOSIS — J45.40 MODERATE PERSISTENT ASTHMA WITHOUT COMPLICATION: Primary | ICD-10-CM

## 2024-05-09 DIAGNOSIS — Z23 NEED FOR VACCINATION FOR PNEUMOCOCCUS: ICD-10-CM

## 2024-05-09 DIAGNOSIS — J34.89 NASAL MASS: ICD-10-CM

## 2024-05-09 LAB
BASOPHILS # BLD AUTO: 0.1 10E3/UL (ref 0–0.2)
BASOPHILS NFR BLD AUTO: 1 %
EOSINOPHIL # BLD AUTO: 0.3 10E3/UL (ref 0–0.7)
EOSINOPHIL NFR BLD AUTO: 5 %
ERYTHROCYTE [DISTWIDTH] IN BLOOD BY AUTOMATED COUNT: 12.2 % (ref 10–15)
FEF 25/75: NORMAL
FEV-1: NORMAL
FEV1/FVC: NORMAL
FVC: NORMAL
HCT VFR BLD AUTO: 45.1 % (ref 40–53)
HGB BLD-MCNC: 15 G/DL (ref 13.3–17.7)
IMM GRANULOCYTES # BLD: 0 10E3/UL
IMM GRANULOCYTES NFR BLD: 0 %
LYMPHOCYTES # BLD AUTO: 2.1 10E3/UL (ref 0.8–5.3)
LYMPHOCYTES NFR BLD AUTO: 34 %
MCH RBC QN AUTO: 29.8 PG (ref 26.5–33)
MCHC RBC AUTO-ENTMCNC: 33.3 G/DL (ref 31.5–36.5)
MCV RBC AUTO: 90 FL (ref 78–100)
MONOCYTES # BLD AUTO: 0.5 10E3/UL (ref 0–1.3)
MONOCYTES NFR BLD AUTO: 9 %
NEUTROPHILS # BLD AUTO: 3.2 10E3/UL (ref 1.6–8.3)
NEUTROPHILS NFR BLD AUTO: 52 %
PLATELET # BLD AUTO: 247 10E3/UL (ref 150–450)
RBC # BLD AUTO: 5.03 10E6/UL (ref 4.4–5.9)
WBC # BLD AUTO: 6.2 10E3/UL (ref 4–11)

## 2024-05-09 PROCEDURE — 36415 COLL VENOUS BLD VENIPUNCTURE: CPT | Performed by: ALLERGY & IMMUNOLOGY

## 2024-05-09 PROCEDURE — 99204 OFFICE O/P NEW MOD 45 MIN: CPT | Mod: 25 | Performed by: ALLERGY & IMMUNOLOGY

## 2024-05-09 PROCEDURE — 94010 BREATHING CAPACITY TEST: CPT | Performed by: ALLERGY & IMMUNOLOGY

## 2024-05-09 PROCEDURE — 90471 IMMUNIZATION ADMIN: CPT | Performed by: ALLERGY & IMMUNOLOGY

## 2024-05-09 PROCEDURE — 90732 PPSV23 VACC 2 YRS+ SUBQ/IM: CPT | Performed by: ALLERGY & IMMUNOLOGY

## 2024-05-09 PROCEDURE — 82785 ASSAY OF IGE: CPT | Performed by: ALLERGY & IMMUNOLOGY

## 2024-05-09 PROCEDURE — 71046 X-RAY EXAM CHEST 2 VIEWS: CPT | Mod: TC | Performed by: RADIOLOGY

## 2024-05-09 PROCEDURE — 86003 ALLG SPEC IGE CRUDE XTRC EA: CPT | Performed by: ALLERGY & IMMUNOLOGY

## 2024-05-09 PROCEDURE — 85025 COMPLETE CBC W/AUTO DIFF WBC: CPT | Performed by: ALLERGY & IMMUNOLOGY

## 2024-05-09 RX ORDER — AZELASTINE 1 MG/ML
2 SPRAY, METERED NASAL 2 TIMES DAILY PRN
Qty: 30 ML | Refills: 3 | Status: SHIPPED | OUTPATIENT
Start: 2024-05-09

## 2024-05-09 RX ORDER — BUDESONIDE AND FORMOTEROL FUMARATE DIHYDRATE 160; 4.5 UG/1; UG/1
2 AEROSOL RESPIRATORY (INHALATION) 2 TIMES DAILY
Qty: 10.2 G | Refills: 4 | Status: SHIPPED | OUTPATIENT
Start: 2024-05-09 | End: 2024-06-20

## 2024-05-09 RX ORDER — AZELASTINE 1 MG/ML
1 SPRAY, METERED NASAL 2 TIMES DAILY
COMMUNITY

## 2024-05-09 RX ORDER — FLUTICASONE PROPIONATE 50 MCG
2 SPRAY, SUSPENSION (ML) NASAL DAILY
Qty: 16 G | Refills: 3 | Status: SHIPPED | OUTPATIENT
Start: 2024-05-09 | End: 2024-07-16

## 2024-05-09 ASSESSMENT — ASTHMA QUESTIONNAIRES
QUESTION_4 LAST FOUR WEEKS HOW OFTEN HAVE YOU USED YOUR RESCUE INHALER OR NEBULIZER MEDICATION (SUCH AS ALBUTEROL): ONCE A WEEK OR LESS
ACT_TOTALSCORE: 17
QUESTION_1 LAST FOUR WEEKS HOW MUCH OF THE TIME DID YOUR ASTHMA KEEP YOU FROM GETTING AS MUCH DONE AT WORK, SCHOOL OR AT HOME: A LITTLE OF THE TIME
QUESTION_3 LAST FOUR WEEKS HOW OFTEN DID YOUR ASTHMA SYMPTOMS (WHEEZING, COUGHING, SHORTNESS OF BREATH, CHEST TIGHTNESS OR PAIN) WAKE YOU UP AT NIGHT OR EARLIER THAN USUAL IN THE MORNING: ONCE OR TWICE
QUESTION_2 LAST FOUR WEEKS HOW OFTEN HAVE YOU HAD SHORTNESS OF BREATH: THREE TO SIX TIMES A WEEK
ACT_TOTALSCORE: 17
QUESTION_5 LAST FOUR WEEKS HOW WOULD YOU RATE YOUR ASTHMA CONTROL: POORLY CONTROLLED

## 2024-05-09 NOTE — PROGRESS NOTES
SUBJECTIVE:                                                                   Raj Smith is a 28-year-old male presents today to our Allergy Clinic at [facility]; He is being seen in consultation at the request of Dr. Kaylee Szymanski for an evaluation of asthma.      History of Present Illness  Approximately 1-2 years ago, the patient started having episodes of almost daily dyspnea, wheezing, and sometimes cough.  The symptoms were occurring daily.  They were exacerbated with exertion, dust exposure, smoke, cold air, and viral respiratory infections.  Perennial pattern of symptoms.  He felt that in Winter, symptoms were worse.  He tried using albuterol which was helpful each time, within 10 to 15 minutes.  In December 2023, he had a pulmonary function test with mild obstruction.  Postbronchodilator spots was noted.  Normal diffusing capacity. About 2 to 3 months ago, he stopped exercising, and at the same time, he started Qvar 80 mcg.  It remarkably improved his symptoms.  He does not have them daily anymore.  He still cannot exercise without using albuterol pre-exertionally.  If he does, he does not develop any exercise-induced symptoms.  If he does not, he develops dyspnea and even lightheadedness.  He still experiences nocturnal awakenings due to dyspnea and wheezing approximately once a week.  I he also reports having daily heartburn.  He was prescribed omeprazole in the past.  He does not use it most of the time.    He also has a long-term history of nasal congestion, rhinorrhea, sneezing, and poor sense of smell.  Perennial pattern without seasonal exacerbations.  About 3 months ago, he was prescribed a nasal spray, which is a combination of triamcinolone, azelastine, and oxymetazoline.  He finds it helpful.  He takes ibuprofen once or twice a week for sinus and headaches.  Denies having rhinitis or asthma exacerbations after taking ibuprofen.          Patient Active Problem  List   Diagnosis    Injury of head, subsequent encounter    Gastroesophageal reflux disease without esophagitis       Past Medical History:   Diagnosis Date    Gastroesophageal reflux disease without esophagitis 2/16/2023      Problem (# of Occurrences) Relation (Name,Age of Onset)    Asthma (1) Brother    Other Cancer (2) Maternal Grandmother: brain tumor, Maternal Grandfather: brain tumor           Negative family history of: Diabetes, Hypertension, Prostate Cancer, Colon Cancer, Breast Cancer          Past Surgical History:   Procedure Laterality Date    COLONOSCOPY N/A 3/3/2021    Procedure: Colonoscopy, With Polypectomy And Biopsy;  Surgeon: Josemanuel Alexander MD;  Location: [facility] OR    COLONOSCOPY WITH CO2 INSUFFLATION N/A 3/3/2021    Procedure: COLONOSCOPY, WITH CO2 INSUFFLATION;  Surgeon: Josemanuel Alexander MD;  Location: [facility] OR    COMBINED ESOPHAGOSCOPY, GASTROSCOPY, DUODENOSCOPY (EGD) WITH CO2 INSUFFLATION N/A 3/3/2021    Procedure: ESOPHAGOGASTRODUODENOSCOPY, WITH CO2 INSUFFLATION;  Surgeon: Josemanuel Alexander MD;  Location: [facility] OR    ESOPHAGOSCOPY, GASTROSCOPY, DUODENOSCOPY (EGD), COMBINED N/A 3/3/2021    Procedure: Esophagogastroduodenoscopy, With Biopsy;  Surgeon: Josemanuel Alexander MD;  Location: [facility] OR    NO HISTORY OF SURGERY       Social History     Socioeconomic History    Marital status:      Spouse name: None    Number of children: 0    Years of education: None    Highest education level: None   Occupational History    Occupation: [employer]   Tobacco Use    Smoking status: Never     Passive exposure: Never    Smokeless tobacco: Never   Vaping Use    Vaping status: Never Used   Substance and Sexual Activity    Alcohol use: Yes     Comment: 1 beer a day 5-7 drinks per week    Drug use: No    Sexual activity: Yes     Partners: Female   Other Topics Concern    Parent/sibling w/ CABG, MI or angioplasty before 65F 55M? No   Social History Narrative    05/09/24         ENVIRONMENTAL HISTORY: [place] The home is heated with a forced air. They does have central air conditioning. The patient's bedroom is furnished with feather/wool bedding or pillows, hard jazzmine in bedroom, and fabric window coverings.  Pets inside the house include 1 dog(s). There no history of cockroach or mice infestation. There is/are 0 smokers in the house.  The house does have a damp basement.                Current Outpatient Medications:     azelastine (ASTELIN) 0.1 % nasal spray, Spray 1 spray into both nostrils 2 times daily, Disp: , Rfl:     azelastine (ASTELIN) 0.1 % nasal spray, Spray 2 sprays into both nostrils 2 times daily as needed for rhinitis, Disp: 30 mL, Rfl: 3    budesonide-formoterol (SYMBICORT) 160-4.5 MCG/ACT Inhaler, Inhale 2 puffs into the lungs 2 times daily, Disp: 10.2 g, Rfl: 4    fluticasone (FLONASE) 50 MCG/ACT nasal spray, Spray 2 sprays into both nostrils daily, Disp: 16 g, Rfl: 3    albuterol (PROAIR HFA/PROVENTIL HFA/VENTOLIN HFA) 108 (90 Base) MCG/ACT inhaler, Inhale 2 puffs into the lungs every 6 hours as needed for shortness of breath, wheezing or cough (Patient not taking: Reported on 5/9/2024), Disp: 36 g, Rfl: 3    cetirizine (ZYRTEC) 10 MG tablet, Take 1 tablet (10 mg) by mouth daily (Patient not taking: Reported on 5/9/2024), Disp: 90 tablet, Rfl: 3    omeprazole (PRILOSEC) 20 MG DR capsule, Take 1 capsule (20 mg) by mouth daily (Patient not taking: Reported on 5/9/2024), Disp: 90 capsule, Rfl: 3    zolpidem (AMBIEN) 10 MG tablet, Take tablet by mouth 15 minutes prior to sleep, for Sleep Study (Patient not taking: Reported on 5/9/2024), Disp: 1 tablet, Rfl: 0  Immunization History   Administered Date(s) Administered    COVID-19 12+ (2023-24) (Pfizer) 10/05/2023    COVID-19 Bivalent 18+ (Moderna) 10/27/2022    COVID-19 Monovalent 18+ (Moderna) 04/06/2021, 05/04/2021, 11/15/2021    DTAP (<7y) 1993, 01/11/1994, 04/26/1994,  "08/10/1995    Flu, Unspecified 09/16/2021    HEPA 09/01/2012    HIB (PRP-T) 1993, 01/11/1994, 04/26/1994, 10/31/1994    HIB, Unspecified 1993, 01/11/1994, 04/26/1994, 10/31/1994    HepB 08/22/2006, 09/01/2012    Hepatitis A (ADULT 19+) 09/01/2012, 10/03/2018    Hepatitis B, Adult 09/01/2012, 10/03/2018    Hepatitis B, Peds 08/22/2006    Influenza (intradermal) 09/04/2012    Influenza Vaccine >6 months,quad, PF 10/10/2018, 02/05/2021    Influenza Vaccine, 6+MO IM (QUADRIVALENT W/PRESERVATIVES) 09/04/2012    Influenza,INJ,MDCK,PF,Quad >6mo(Flucelvax) 10/25/2022, 10/05/2023    Japanese Encephalitis IM 10/03/2018, 10/10/2018    MMR 10/31/1994, 08/22/2006    Meningococcal (Menomune ) 08/31/2009    Meningococcal ACWY (Menactra ) 08/31/2009    Polio, Unspecified 1993, 01/11/1994, 08/10/1995    Poliovirus, inactivated (IPV) 1993, 01/11/1994, 08/10/1995, 09/01/2012    Rabies - IM Diploid Cell Culture 10/03/2018, 10/10/2018, 10/24/2018    TD,PF 7+ (Tenivac) 08/22/2006, 09/01/2012    TDAP Vaccine (Adacel) 08/22/2006, 09/01/2012, 10/24/2018    Typhoid IM 09/01/2012, 10/10/2018    Typhoid-h-p 09/01/2012    Yellow Fever 09/01/2012     No Known Allergies  OBJECTIVE:                                                                 /77 (BP Location: Left arm, Patient Position: Sitting, Cuff Size: Adult Regular)   Pulse 50   Temp 97.5  F (36.4  C) (Tympanic)   Ht 1.905 m (6' 3\")   Wt 99.6 kg (219 lb 9.6 oz)   SpO2 98%   BMI 27.45 kg/m          Physical Exam  Vitals and nursing note reviewed.   Constitutional:       General: He is not in acute distress.     Appearance: He is not diaphoretic.   HENT:      Head: Normocephalic and atraumatic.      Right Ear: Tympanic membrane, ear canal and external ear normal.      Left Ear: Tympanic membrane, ear canal and external ear normal.      Nose: No mucosal edema or rhinorrhea.      Right Turbinates: Not enlarged.      Left Turbinates: Not enlarged.      " Comments: In the right middle meatus there is a gray mass. Unable to differentiate between nasal polyp versus polypoid degeneration or hypertrophy of nasal turbinate.        Mouth/Throat:      Lips: Pink.      Mouth: Mucous membranes are moist.      Pharynx: Oropharynx is clear. No pharyngeal swelling, oropharyngeal exudate or posterior oropharyngeal erythema.   Eyes:      General:         Right eye: No discharge.         Left eye: No discharge.      Conjunctiva/sclera: Conjunctivae normal.   Cardiovascular:      Rate and Rhythm: Normal rate and regular rhythm.      Heart sounds: Normal heart sounds. No murmur heard.  Pulmonary:      Effort: Pulmonary effort is normal. No respiratory distress.      Breath sounds: Normal breath sounds and air entry. No stridor, decreased air movement or transmitted upper airway sounds. No decreased breath sounds, wheezing, rhonchi or rales.   Musculoskeletal:         General: Normal range of motion.   Neurological:      Mental Status: He is alert and oriented to person, place, and time.   Psychiatric:         Mood and Affect: Mood normal.         Behavior: Behavior normal.             WORKUP:   SPIROMETRY       FVC 5.45L (86% of predicted).     FEV1 3.84L (75% of predicted).     FEV1/FVC 70%      I have reviewed and interpreted these results.  The office spirometry performed today suggests mild obstruction.    Asthma Control Test (ACT) total score: 17         ASSESSMENT/PLAN:    Assessment & Plan      Moderate persistent asthma without complication  Gastroesophageal reflux disease without esophagitis      Symptoms started about 2 years ago.  Partially improved with initiation of Qvar.  Symptoms are still suboptimally controlled.  - Will obtain chest x-ray, 2 views.  - Stop Qvar.  - Start Symbicort 160/4.5 mcg 2 puffs twice daily.  - Continues albuterol inhaler as needed and pre-exertionally.  - Use both Symbicort and albuterol inhalers with a chamber device.  - Considering asthma  diagnosis, administered pneumococcal vaccine.  - Ordered CBC with differential screening for eosinophilia.  -Considering daily heartburn, prescribed omeprazole 20 mg by mouth once daily.  We discussed how uncontrolled GERD could contribute to poor asthma control.    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  In the right middle meatus, cannot differentiate between true polyp versus polypoid degeneration.  - Referred to Dr. Alvarez.  -Ordered serum IgE for regional aeroallergen panel.  -He will stop his compounded nasal spray.  - Use intranasal fluticasone (Flonase) 1-2 sprays in each nostril once daily.  - Add azelastine nasal spray, 2 sprays in each nostril twice daily as needed.    - Adult ENT  Referral  - IgE  - Allergen cat epithellium IgE  - Allergen dog epithelium IgE  - Allergen John grass IgE  - Allergen orchard grass IgE  - Allergen alfnozo IgE  - Allergen D farinae IgE  - Allergen D pteronyssinus IgE  - Allergen alternaria alternata IgE  - Allergen aspergillus fumigatus IgE  - Allergen cladosporium herbarum IgE  - Allergen Epicoccum purpurascens IgE  - Allergen penicillium notatum IgE  - Allergen jolene white IgE  - Allergen Cedar IgE  - Allergen cottonwood IgE  - Allergen elm IgE  - Allergen maple box elder IgE  - Allergen oak white IgE  - Allergen Red [place] IgE  - Allergen silver  birch IgE  - Allergen Tree White [place] IgE  - Allergen Lincoln Tree  - Allergen white pine IgE  - Allergen English plantain IgE  - Allergen giant ragweed IgE  - Allergen lamb's quarter IgE  - Allergen Mugwort IgE  - Allergen ragweed short IgE  - Allergen Sagebrush [place] IgE  - Allergen Sheep [place] IgE  -  Allergen thistle Russian IgE  - Allergen Weed Nettle IgE  - Allergen, Kochia/Firebush  - CBC with Platelets & Differential  - fluticasone (FLONASE) 50 MCG/ACT nasal spray  Dispense: 16 g; Refill: 3  - azelastine (ASTELIN) 0.1 % nasal spray  Dispense: 30 mL; Refill: 3      Need for vaccination for pneumococcus    - PNEUMOCOCCAL POLYSACCHARIDE PPV23 (PNEUMOVAX 23)          Follow up in 6 weeks or sooner if needed.     Thank you for allowing us to participate in the care of this patient. Please feel free to contact us if there are any questions or concerns about the patient.    Disclaimer: This note consists of symbols derived from keyboarding, dictation and/or voice recognition software. As a result, there may be errors in the script that have gone undetected. Please consider this when interpreting information found in this chart.  Consent was obtained from the patient to use an AI documentation tool in the creation of this note.     Paramjit Valdivia MD, FAAAAI, FACAAI  Allergy and Asthma     [facility]

## 2024-05-09 NOTE — LETTER
5/9/2024         RE: Raj Smith  [address] [facility] [address]        Dear Colleague,    Thank you for referring your patient, Raj Smith, to the [facility]. Please see a copy of my visit note below.    SUBJECTIVE:                                                                   Raj Smith is a 28-year-old male presents today to our Allergy Clinic at [facility]; He is being seen in consultation at the request of Dr. Kaylee Szymanski for an evaluation of asthma.      History of Present Illness  Approximately 1-2 years ago, the patient started having episodes of almost daily dyspnea, wheezing, and sometimes cough.  The symptoms were occurring daily.  They were exacerbated with exertion, dust exposure, smoke, cold air, and viral respiratory infections.  Perennial pattern of symptoms.  He felt that in Winter, symptoms were worse.  He tried using albuterol which was helpful each time, within 10 to 15 minutes.  In December 2023, he had a pulmonary function test with mild obstruction.  Postbronchodilator spots was noted.  Normal diffusing capacity. About 2 to 3 months ago, he stopped exercising, and at the same time, he started Qvar 80 mcg.  It remarkably improved his symptoms.  He does not have them daily anymore.  He still cannot exercise without using albuterol pre-exertionally.  If he does, he does not develop any exercise-induced symptoms.  If he does not, he develops dyspnea and even lightheadedness.  He still experiences nocturnal awakenings due to dyspnea and wheezing approximately once a week.  I he also reports having daily heartburn.  He was prescribed omeprazole in the past.  He does not use it most of the time.    He also has a long-term history of nasal congestion, rhinorrhea, sneezing, and poor sense of smell.  Perennial pattern without seasonal exacerbations.  About 3 months ago, he was prescribed a nasal spray, which  is a combination of triamcinolone, azelastine, and oxymetazoline.  He finds it helpful.  He takes ibuprofen once or twice a week for sinus and headaches.  Denies having rhinitis or asthma exacerbations after taking ibuprofen.          Patient Active Problem List   Diagnosis     Injury of head, subsequent encounter     Gastroesophageal reflux disease without esophagitis       Past Medical History:   Diagnosis Date     Gastroesophageal reflux disease without esophagitis 2/16/2023      Problem (# of Occurrences) Relation (Name,Age of Onset)    Asthma (1) Brother    Other Cancer (2) Maternal Grandmother: brain tumor, Maternal Grandfather: brain tumor           Negative family history of: Diabetes, Hypertension, Prostate Cancer, Colon Cancer, Breast Cancer          Past Surgical History:   Procedure Laterality Date     COLONOSCOPY N/A 3/3/2021    Procedure: Colonoscopy, With Polypectomy And Biopsy;  Surgeon: Josemanuel Alexander MD;  Location: [facility] OR     COLONOSCOPY WITH CO2 INSUFFLATION N/A 3/3/2021    Procedure: COLONOSCOPY, WITH CO2 INSUFFLATION;  Surgeon: Josemanuel Alexander MD;  Location: [facility] OR     COMBINED ESOPHAGOSCOPY, GASTROSCOPY, DUODENOSCOPY (EGD) WITH CO2 INSUFFLATION N/A 3/3/2021    Procedure: ESOPHAGOGASTRODUODENOSCOPY, WITH CO2 INSUFFLATION;  Surgeon: Josemanuel Alexander MD;  Location: [facility] OR     ESOPHAGOSCOPY, GASTROSCOPY, DUODENOSCOPY (EGD), COMBINED N/A 3/3/2021    Procedure: Esophagogastroduodenoscopy, With Biopsy;  Surgeon: Josemanuel Alexander MD;  Location: [facility] OR     NO HISTORY OF SURGERY       Social History     Socioeconomic History     Marital status:      Spouse name: None     Number of children: 0     Years of education: None     Highest education level: None   Occupational History     Occupation: [email or web address]   Tobacco Use     Smoking status: Never     Passive exposure: Never     Smokeless tobacco: Never   Vaping Use     Vaping status: Never Used   Substance and  Sexual Activity     Alcohol use: Yes     Comment: 1 beer a day 5-7 drinks per week     Drug use: No     Sexual activity: Yes     Partners: Female   Other Topics Concern     Parent/sibling w/ CABG, MI or angioplasty before 65F 55M? No   Social History Narrative    05/09/24        ENVIRONMENTAL HISTORY: [place] The home is heated with a forced air. They does have central air conditioning. The patient's bedroom is furnished with feather/wool bedding or pillows, hard jazzmine in bedroom, and fabric window coverings.  Pets inside the house include 1 dog(s). There no history of cockroach or mice infestation. There is/are 0 smokers in the house.  The house does have a damp basement.                Current Outpatient Medications:      azelastine (ASTELIN) 0.1 % nasal spray, Spray 1 spray into both nostrils 2 times daily, Disp: , Rfl:      azelastine (ASTELIN) 0.1 % nasal spray, Spray 2 sprays into both nostrils 2 times daily as needed for rhinitis, Disp: 30 mL, Rfl: 3     budesonide-formoterol (SYMBICORT) 160-4.5 MCG/ACT Inhaler, Inhale 2 puffs into the lungs 2 times daily, Disp: 10.2 g, Rfl: 4     fluticasone (FLONASE) 50 MCG/ACT nasal spray, Spray 2 sprays into both nostrils daily, Disp: 16 g, Rfl: 3     albuterol (PROAIR HFA/PROVENTIL HFA/VENTOLIN HFA) 108 (90 Base) MCG/ACT inhaler, Inhale 2 puffs into the lungs every 6 hours as needed for shortness of breath, wheezing or cough (Patient not taking: Reported on 5/9/2024), Disp: 36 g, Rfl: 3     cetirizine (ZYRTEC) 10 MG tablet, Take 1 tablet (10 mg) by mouth daily (Patient not taking: Reported on 5/9/2024), Disp: 90 tablet, Rfl: 3     omeprazole (PRILOSEC) 20 MG DR capsule, Take 1 capsule (20 mg) by mouth daily (Patient not taking: Reported on 5/9/2024), Disp: 90 capsule, Rfl: 3     zolpidem (AMBIEN) 10 MG tablet, Take tablet by mouth 15 minutes prior to sleep, for Sleep Study (Patient not taking: Reported on 5/9/2024), Disp: 1  "tablet, Rfl: 0  Immunization History   Administered Date(s) Administered     COVID-19 12+ (2023-24) (Pfizer) 10/05/2023     COVID-19 Bivalent 18+ (Moderna) 10/27/2022     COVID-19 Monovalent 18+ (Moderna) 04/06/2021, 05/04/2021, 11/15/2021     DTAP (<7y) 1993, 01/11/1994, 04/26/1994, 08/10/1995     Flu, Unspecified 09/16/2021     HEPA 09/01/2012     HIB (PRP-T) 1993, 01/11/1994, 04/26/1994, 10/31/1994     HIB, Unspecified 1993, 01/11/1994, 04/26/1994, 10/31/1994     HepB 08/22/2006, 09/01/2012     Hepatitis A (ADULT 19+) 09/01/2012, 10/03/2018     Hepatitis B, Adult 09/01/2012, 10/03/2018     Hepatitis B, Peds 08/22/2006     Influenza (intradermal) 09/04/2012     Influenza Vaccine >6 months,quad, PF 10/10/2018, 02/05/2021     Influenza Vaccine, 6+MO IM (QUADRIVALENT W/PRESERVATIVES) 09/04/2012     Influenza,INJ,MDCK,PF,Quad >6mo(Flucelvax) 10/25/2022, 10/05/2023     Japanese Encephalitis IM 10/03/2018, 10/10/2018     MMR 10/31/1994, 08/22/2006     Meningococcal (Menomune ) 08/31/2009     Meningococcal ACWY (Menactra ) 08/31/2009     Polio, Unspecified 1993, 01/11/1994, 08/10/1995     Poliovirus, inactivated (IPV) 1993, 01/11/1994, 08/10/1995, 09/01/2012     Rabies - IM Diploid Cell Culture 10/03/2018, 10/10/2018, 10/24/2018     TD,PF 7+ (Tenivac) 08/22/2006, 09/01/2012     TDAP Vaccine (Adacel) 08/22/2006, 09/01/2012, 10/24/2018     Typhoid IM 09/01/2012, 10/10/2018     Typhoid-h-p 09/01/2012     Yellow Fever 09/01/2012     No Known Allergies  OBJECTIVE:                                                                 /77 (BP Location: Left arm, Patient Position: Sitting, Cuff Size: Adult Regular)   Pulse 50   Temp 97.5  F (36.4  C) (Tympanic)   Ht 1.905 m (6' 3\")   Wt 99.6 kg (219 lb 9.6 oz)   SpO2 98%   BMI 27.45 kg/m          Physical Exam  Vitals and nursing note reviewed.   Constitutional:       General: He is not in acute distress.     Appearance: He is not diaphoretic. "   HENT:      Head: Normocephalic and atraumatic.      Right Ear: Tympanic membrane, ear canal and external ear normal.      Left Ear: Tympanic membrane, ear canal and external ear normal.      Nose: No mucosal edema or rhinorrhea.      Right Turbinates: Not enlarged.      Left Turbinates: Not enlarged.      Comments: In the right middle meatus there is a gray mass. Unable to differentiate between nasal polyp versus polypoid degeneration or hypertrophy of nasal turbinate.        Mouth/Throat:      Lips: Pink.      Mouth: Mucous membranes are moist.      Pharynx: Oropharynx is clear. No pharyngeal swelling, oropharyngeal exudate or posterior oropharyngeal erythema.   Eyes:      General:         Right eye: No discharge.         Left eye: No discharge.      Conjunctiva/sclera: Conjunctivae normal.   Cardiovascular:      Rate and Rhythm: Normal rate and regular rhythm.      Heart sounds: Normal heart sounds. No murmur heard.  Pulmonary:      Effort: Pulmonary effort is normal. No respiratory distress.      Breath sounds: Normal breath sounds and air entry. No stridor, decreased air movement or transmitted upper airway sounds. No decreased breath sounds, wheezing, rhonchi or rales.   Musculoskeletal:         General: Normal range of motion.   Neurological:      Mental Status: He is alert and oriented to person, place, and time.   Psychiatric:         Mood and Affect: Mood normal.         Behavior: Behavior normal.             WORKUP:   SPIROMETRY       FVC 5.45L (86% of predicted).     FEV1 3.84L (75% of predicted).     FEV1/FVC 70%      I have reviewed and interpreted these results.  The office spirometry performed today suggests mild obstruction.    Asthma Control Test (ACT) total score: 17         ASSESSMENT/PLAN:    Assessment & Plan      Moderate persistent asthma without complication  Gastroesophageal reflux disease without esophagitis      Symptoms started about 2 years ago.  Partially improved with initiation of  Qvar.  Symptoms are still suboptimally controlled.  - Will obtain chest x-ray, 2 views.  - Stop Qvar.  - Start Symbicort 160/4.5 mcg 2 puffs twice daily.  - Continues albuterol inhaler as needed and pre-exertionally.  - Use both Symbicort and albuterol inhalers with a chamber device.  - Considering asthma diagnosis, administered pneumococcal vaccine.  - Ordered CBC with differential screening for eosinophilia.  -Considering daily heartburn, prescribed omeprazole 20 mg by mouth once daily.  We discussed how uncontrolled GERD could contribute to poor asthma control.    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  In the right middle meatus, cannot differentiate between true polyp versus polypoid degeneration.  - Referred to Dr. Alvarez.  -Ordered serum IgE for regional aeroallergen panel.  -He will stop his compounded nasal spray.  - Use intranasal fluticasone (Flonase) 1-2 sprays in each nostril once daily.  - Add azelastine nasal spray, 2 sprays in each nostril twice daily as needed.    - Adult ENT  Referral  - IgE  - Allergen cat epithellium IgE  - Allergen dog epithelium IgE  - Allergen John grass IgE  - Allergen orchard grass IgE  - Allergen alfonzo IgE  - Allergen D farinae IgE  - Allergen D pteronyssinus IgE  - Allergen alternaria alternata IgE  - Allergen aspergillus fumigatus IgE  - Allergen cladosporium herbarum IgE  - Allergen Epicoccum purpurascens IgE  - Allergen penicillium notatum IgE  - Allergen jolene white IgE  - Allergen Cedar IgE  - Allergen cottonwood IgE  - Allergen elm IgE  - Allergen maple box elder IgE  - Allergen oak white IgE  - Allergen Red Babcock IgE  - Allergen silver   birch IgE  - Allergen Tree White [place] IgE  - Allergen [place] Tree  - Allergen white pine IgE  - Allergen English plantain IgE  - Allergen giant ragweed IgE  - Allergen lamb's quarter IgE  - Allergen Mugwort IgE  - Allergen ragweed short IgE  - Allergen Sagebrush Wormwood IgE  - Allergen Sheep Sorrel IgE  - Allergen thistle Russian IgE  - Allergen Weed Nettle IgE  - Allergen, Kochia/Firebush  - CBC with Platelets & Differential  - fluticasone (FLONASE) 50 MCG/ACT nasal spray  Dispense: 16 g; Refill: 3  - azelastine (ASTELIN) 0.1 % nasal spray  Dispense: 30 mL; Refill: 3      Need for vaccination for pneumococcus    - PNEUMOCOCCAL POLYSACCHARIDE PPV23 (PNEUMOVAX 23)          Follow up in 6 weeks or sooner if needed.     Thank you for allowing us to participate in the care of this patient. Please feel free to contact us if there are any questions or concerns about the patient.    Disclaimer: This note consists of symbols derived from keyboarding, dictation and/or voice recognition software. As a result, there may be errors in the script that have gone undetected. Please consider this when interpreting information found in this chart.  Consent was obtained from the patient to use an AI documentation tool in the creation of this note.     Paramjit Valdivia MD, FAAAAI, FACAAI  Allergy and Asthma     [facility]        Again, thank you for allowing me to participate in the care of your patient.        Sincerely,        Paramjit Valdivia MD

## 2024-05-09 NOTE — NURSING NOTE
Prior to immunization administration, verified patients identity using patient s name and date of birth. Please see Immunization Activity for additional information.     Screening Questionnaire for Adult Immunization    Are you sick today?   No   Do you have allergies to medications, food, a vaccine component or latex?   No   Have you ever had a serious reaction after receiving a vaccination?   No   Do you have a long-term health problem with heart, lung, kidney, or metabolic disease (e.g., diabetes), asthma, a blood disorder, no spleen, complement component deficiency, a cochlear implant, or a spinal fluid leak?  Are you on long-term aspirin therapy?   Yes   Do you have cancer, leukemia, HIV/AIDS, or any other immune system problem?   No   Do you have a parent, brother, or sister with an immune system problem?   No   In the past 3 months, have you taken medications that affect  your immune system, such as prednisone, other steroids, or anticancer drugs; drugs for the treatment of rheumatoid arthritis, Crohn s disease, or psoriasis; or have you had radiation treatments?   No   Have you had a seizure, or a brain or other nervous system problem?   No   During the past year, have you received a transfusion of blood or blood    products, or been given immune (gamma) globulin or antiviral drug?   No   For women: Are you pregnant or is there a chance you could become       pregnant during the next month?   No   Have you received any vaccinations in the past 4 weeks?   No     Immunization questionnaire was positive for at least one answer.  Notified Dr Valdivia.      Patient instructed to remain in clinic for 15 minutes afterwards, and to report any adverse reactions.     Screening performed by Panda Salazar MA on 5/9/2024 at 9:17 AM.

## 2024-05-09 NOTE — PATIENT INSTRUCTIONS
Get the bloodwork and chest x-ray done today.     Stop QVAR.  - Use Symbicort 160/4.5 mcg inhaler 2 puffs twice daily. Use it with a spacer/chamber device. Rinse/gargle/spit water after use.    -Continue using albuterol inhaler 2-4 puffs every 4 hours as needed for chest tightness/wheezing/shortness of breath/persistent cough.  -Use it all MDI inhalers with spacer/chamber device.     Take omeprazole 20 mg by mouth once daily.    -Use albuterol 2-4 puffs inhaled 15-20 minutes prior to strenuous physical activity.     Recommend warming up prior to exercise.   -Start intranasal fluticasone (Flonase) 1-2 sprays in each nostril once daily.  -Start azelastine nasal spray, 2 sprays in each nostril twice daily as needed.       [address], [address] [place] [address]    [phone]        Prescription Assistance  If you need assistance with your prescriptions (cost, coverage, etc) please contact: [place] [facility] [phone]           If labs have been ordered/completed, please allow 7-14 business days for final interpretation of results to be sent on My Chart, phone or mail. Some lab results can take up to 28 days for results.         Allergy Staff Appt Hours Shot Hours Locations    Physician      Paramjit Valdivia MD         Support Staff      Senait Mosqueda MA     Tuesday:   [place] 7-5 Wednesday:  [place]: 7-5 Thursday:         [place] 7-5 Friday:  [place] 7-3     [place]        Tuesday: 7- 4:20        Wednesday: 7-4:20      [place] Monday: 7-4:10        Tuesday: 7-4:10        Thursday: 7-3:10     [facility]       Tues & Wed: 7-5:10       Thurs: 12-4:10       Fri: 7-11            [facility]  [address]   [place] [address]  Appt Line: [phone]        [facility]  [address] [place] [address]  Appt Line: [phone]     Pulmonary Function Scheduling:  [place]: [phone]  [place]:  [phone]  [phone]

## 2024-05-09 NOTE — NURSING NOTE
Writer demonstrated how to use an MDI inhaler with a spacer for patient.  Patient (parent or guardian) instructed to shake the inhaler for 5 seconds, empty the lungs by exhaling away from inhaler, put the inhaler + spacer in their mouth, push down on the inhaler and breathe in at the same time and then hold breath for 10 seconds.  RN informed patient (parent or guardian) that if an audible whistle is heard from spacer, they need to inhale more slowly.  Patient (parent or guardian) advised to wait 1-2 minutes between puffs.  Patient (parent or guardian) instructed on how to clean the MDI inhaler, take the medication canister out, wash it in warm soapy water, rinse it and then let it dry over night.  RN advised patient (parent or guardian) to refer to handout with spacer for cleaning instructions.  Patient (parent or guardian) informed the inhaler needs to be washed once a week or when he notices a powder buildup.  Patient (parent or guardian) verbalized understanding.     Senait PRABHAKAR RN  Specialty/Allergy Clinics

## 2024-05-10 NOTE — RESULT ENCOUNTER NOTE
I have reviewed and interpreted these results.  The office spirometry performed today suggests mild obstruction.

## 2024-05-10 NOTE — RESULT ENCOUNTER NOTE
[employer] message sent:   chest x-ray:  No acute cardiopulmonary changes.  -No changes in the plan.

## 2024-05-13 LAB
A ALTERNATA IGE QN: <0.1 KU(A)/L
A FUMIGATUS IGE QN: <0.1 KU(A)/L
C HERBARUM IGE QN: <0.1 KU(A)/L
CALIF WALNUT POLN IGE QN: 0.16 KU(A)/L
CAT DANDER IGG QN: <0.1 KU(A)/L
CEDAR IGE QN: <0.1 KU(A)/L
COCKSFOOT IGE QN: <0.1 KU(A)/L
COMMON RAGWEED IGE QN: <0.1 KU(A)/L
COTTONWOOD IGE QN: 0.16 KU(A)/L
D FARINAE IGE QN: <0.1 KU(A)/L
D PTERONYSS IGE QN: <0.1 KU(A)/L
DOG DANDER+EPITH IGE QN: <0.1 KU(A)/L
E PURPURASCENS IGE QN: <0.1 KU(A)/L
EAST WHITE PINE IGE QN: <0.1 KU(A)/L
ENGL PLANTAIN IGE QN: <0.1 KU(A)/L
FIREBUSH IGE QN: <0.1 KU(A)/L
GIANT RAGWEED IGE QN: 0.19 KU(A)/L
GOOSEFOOT IGE QN: <0.1 KU(A)/L
IGE SERPL-ACNC: 66 KU/L (ref 0–114)
JOHNSON GRASS IGE QN: <0.1 KU(A)/L
MAPLE IGE QN: 0.22 KU(A)/L
MUGWORT IGE QN: <0.1 KU(A)/L
NETTLE IGE QN: 0.11 KU(A)/L
P NOTATUM IGE QN: <0.1 KU(A)/L
RED MULBERRY IGE QN: <0.1 KU(A)/L
SALTWORT IGE QN: <0.1 KU(A)/L
SHEEP SORREL IGE QN: <0.1 KU(A)/L
SILVER BIRCH IGE QN: 0.23 KU(A)/L
TIMOTHY IGE QN: <0.1 KU(A)/L
WHITE ASH IGE QN: 0.13 KU(A)/L
WHITE ELM IGE QN: 0.29 KU(A)/L
WHITE MULBERRY IGE QN: <0.1 KU(A)/L
WHITE OAK IGE QN: 0.11 KU(A)/L
WORMWOOD IGE QN: <0.1 KU(A)/L

## 2024-05-13 NOTE — RESULT ENCOUNTER NOTE
[employer] message sent:   CBC with differential is within normal limits.  Absolute eosinophil count 300.  Total serum IgE is within normal limits.  Serum IgE for the regional aeroallergen panel with a slight sensitivity to tree and weed pollen.  Frankly, majority of the patients with levels being so low do not have a lot of allergy symptoms.  Sensitivity to tree and weed pollen would not explain all year-round nasal symptoms.  - No changes in the previously discussed treatment and evaluation plan.

## 2024-06-20 ENCOUNTER — OFFICE VISIT (OUTPATIENT)
Dept: ALLERGY | Facility: CLINIC | Age: 29
End: 2024-06-20
Payer: COMMERCIAL

## 2024-06-20 VITALS
HEART RATE: 71 BPM | OXYGEN SATURATION: 99 % | WEIGHT: 220.6 LBS | BODY MASS INDEX: 27.43 KG/M2 | DIASTOLIC BLOOD PRESSURE: 78 MMHG | SYSTOLIC BLOOD PRESSURE: 115 MMHG | HEIGHT: 75 IN | TEMPERATURE: 96 F

## 2024-06-20 DIAGNOSIS — J32.4 CHRONIC PANSINUSITIS: ICD-10-CM

## 2024-06-20 DIAGNOSIS — J45.40 MODERATE PERSISTENT ASTHMA WITHOUT COMPLICATION: Primary | ICD-10-CM

## 2024-06-20 LAB
FEF 25/75: NORMAL
FEV-1: NORMAL
FEV1/FVC: NORMAL
FVC: NORMAL

## 2024-06-20 PROCEDURE — 94010 BREATHING CAPACITY TEST: CPT | Performed by: ALLERGY & IMMUNOLOGY

## 2024-06-20 PROCEDURE — 99214 OFFICE O/P EST MOD 30 MIN: CPT | Mod: 25 | Performed by: ALLERGY & IMMUNOLOGY

## 2024-06-20 RX ORDER — BUDESONIDE AND FORMOTEROL FUMARATE DIHYDRATE 160; 4.5 UG/1; UG/1
2 AEROSOL RESPIRATORY (INHALATION) 2 TIMES DAILY
Qty: 10.2 G | Refills: 5 | Status: SHIPPED | OUTPATIENT
Start: 2024-06-20 | End: 2024-07-16

## 2024-06-20 RX ORDER — BUDESONIDE 0.5 MG/2ML
0.5 INHALANT ORAL DAILY
COMMUNITY
End: 2024-07-23

## 2024-06-20 ASSESSMENT — ASTHMA QUESTIONNAIRES
QUESTION_1 LAST FOUR WEEKS HOW MUCH OF THE TIME DID YOUR ASTHMA KEEP YOU FROM GETTING AS MUCH DONE AT WORK, SCHOOL OR AT HOME: A LITTLE OF THE TIME
QUESTION_3 LAST FOUR WEEKS HOW OFTEN DID YOUR ASTHMA SYMPTOMS (WHEEZING, COUGHING, SHORTNESS OF BREATH, CHEST TIGHTNESS OR PAIN) WAKE YOU UP AT NIGHT OR EARLIER THAN USUAL IN THE MORNING: NOT AT ALL
ACT_TOTALSCORE: 21
QUESTION_5 LAST FOUR WEEKS HOW WOULD YOU RATE YOUR ASTHMA CONTROL: SOMEWHAT CONTROLLED
QUESTION_4 LAST FOUR WEEKS HOW OFTEN HAVE YOU USED YOUR RESCUE INHALER OR NEBULIZER MEDICATION (SUCH AS ALBUTEROL): NOT AT ALL

## 2024-06-20 NOTE — LETTER
6/20/2024      Raj Smith  [address] [facility] [address]      Dear Colleague,    Thank you for referring your patient, Raj Smith, to the [facility]. Please see a copy of my visit note below.    SUBJECTIVE:                                                                   Raj Smith presents today to our Allergy Clinic at [facility] for a follow up visit. He is a 29 year old male with history of asthma, chronic rhinitis and chronic rhinosinusitis.    In his late 20s, the patient started having episodes of dyspnea, wheezing, and sometimes cough.  Triggered by exertion, dust exposure, smoke, cold air, and viral respiratory infections.  It was a perennial pattern of symptoms.  Possibly, in winter, he felt worse.  In December 2023, pulmonary function test with mild obstruction.  Postbronchodilator response was noted.  Started Qvar 80 mcg which remarkably improved his symptoms.  He still could not exercise properly without using albuterol.  He still would experience nocturnal awakening due to dyspnea and wheezing about once a week.  The patient was complaining of a daily heartburn.  In May 2024, spirometry suggested mild obstruction.    The patient also mentioned long-term history of nasal congestion, rhinorrhea, sneezing, and poor sense of smell.  When I saw the patient in May 2024, I was unable to differentiate between nasal polyposis versus polypoid degeneration.    In May 2024, CBC with differential within normal limits.  Absolute eosinophil count 300.  Total serum IgE within normal limits.  Serum IgE for the regional aeroallergen panel with a slight sensitivity to tree and weed pollen.    The patient recently saw Dr. Alvarez.  Dr. Alvarez send me a note today (June 20, 2024) sinus CT showed significant CRS without nasal polyps.  Sinus surgery was recommended.    Chronic rhinosinusitis without nasal polyposis    The patient brought  a sinus CT interpretation showing a deviation of the nasal septum to the left anteriorly and a spur posteriorly, with hypertrophy of turbinates and moderate inflammatory changes involving all sinuses. He is planning to undergo sinus surgery.    Yesterday, he received a triamcinolone injection, which he feels has already been helpful. He continues using intranasal fluticasone and azelastine and reports that nasal congestion, rhinorrhea, and sneezing have reduced. However, his sense of smell is still completely absent. He was prescribed budesonide/saline irrigations but has not filled the prescription yet.    Asthma    The patient's breathing has improved after switching from Qvar to Symbicort and starting omeprazole. He uses Symbicort 160/4.5 mcg, 2 puffs twice daily, and has needed albuterol only once or twice since then. Nocturnal symptoms have almost resolved, and exercise tolerance has improved. He experiences shortness of breath only once or twice a week, whereas in the past, it occurred all the time. He no longer feels lightheaded with exertion.            Patient Active Problem List   Diagnosis     Injury of head, subsequent encounter     Gastroesophageal reflux disease without esophagitis       Past Medical History:   Diagnosis Date     Gastroesophageal reflux disease without esophagitis 2/16/2023      Problem (# of Occurrences) Relation (Name,Age of Onset)    Asthma (1) Brother    Other Cancer (2) Maternal Grandmother: brain tumor, Maternal Grandfather: brain tumor           Negative family history of: Diabetes, Hypertension, Prostate Cancer, Colon Cancer, Breast Cancer          Past Surgical History:   Procedure Laterality Date     COLONOSCOPY N/A 3/3/2021    Procedure: Colonoscopy, With Polypectomy And Biopsy;  Surgeon: Josemanuel Alexander MD;  Location: [facility] OR     COLONOSCOPY WITH CO2 INSUFFLATION N/A 3/3/2021    Procedure: COLONOSCOPY, WITH CO2 INSUFFLATION;  Surgeon: Josemanuel Alexander,  MD;  Location: [facility] OR     COMBINED ESOPHAGOSCOPY, GASTROSCOPY, DUODENOSCOPY (EGD) WITH CO2 INSUFFLATION N/A 3/3/2021    Procedure: ESOPHAGOGASTRODUODENOSCOPY, WITH CO2 INSUFFLATION;  Surgeon: Josemanuel Alexander MD;  Location: [facility] OR     ESOPHAGOSCOPY, GASTROSCOPY, DUODENOSCOPY (EGD), COMBINED N/A 3/3/2021    Procedure: Esophagogastroduodenoscopy, With Biopsy;  Surgeon: Josemanuel Alexander MD;  Location: [facility] OR     NO HISTORY OF SURGERY       Social History     Socioeconomic History     Marital status:      Spouse name: None     Number of children: 0     Years of education: None     Highest education level: None   Occupational History     Occupation: [employer]   Tobacco Use     Smoking status: Never     Passive exposure: Never     Smokeless tobacco: Never   Vaping Use     Vaping status: Never Used   Substance and Sexual Activity     Alcohol use: Yes     Comment: 1 beer a day 5-7 drinks per week     Drug use: No     Sexual activity: Yes     Partners: Female   Other Topics Concern     Parent/sibling w/ CABG, MI or angioplasty before 65F 55M? No   Social History Narrative    06/20/24        ENVIRONMENTAL HISTORY: [place] The home is heated with a forced air. They does have central air conditioning. The patient's bedroom is furnished with feather/wool bedding or pillows, hard jazzmine in bedroom, and fabric window coverings.  Pets inside the house include 1 dog(s). There no history of cockroach or mice infestation. There is/are 0 smokers in the house.  The house does have a damp basement.              Current Outpatient Medications:      azelastine (ASTELIN) 0.1 % nasal spray, Spray 2 sprays into both nostrils 2 times daily as needed for rhinitis, Disp: 30 mL, Rfl: 3     budesonide (PULMICORT) 0.5 MG/2ML neb solution, Take 0.5 mg by nebulization daily Pour in nedipot, Disp: , Rfl:      budesonide-formoterol (SYMBICORT) 160-4.5 MCG/ACT Inhaler, Inhale 2 puffs  into the lungs 2 times daily, Disp: 10.2 g, Rfl: 5     fluticasone (FLONASE) 50 MCG/ACT nasal spray, Spray 2 sprays into both nostrils daily, Disp: 16 g, Rfl: 3     omeprazole (PRILOSEC) 20 MG DR capsule, Take 1 capsule (20 mg) by mouth daily, Disp: 90 capsule, Rfl: 3     albuterol (PROAIR HFA/PROVENTIL HFA/VENTOLIN HFA) 108 (90 Base) MCG/ACT inhaler, Inhale 2 puffs into the lungs every 6 hours as needed for shortness of breath, wheezing or cough (Patient not taking: Reported on 5/9/2024), Disp: 36 g, Rfl: 3     azelastine (ASTELIN) 0.1 % nasal spray, Spray 1 spray into both nostrils 2 times daily (Patient not taking: Reported on 6/20/2024), Disp: , Rfl:      cetirizine (ZYRTEC) 10 MG tablet, Take 1 tablet (10 mg) by mouth daily (Patient not taking: Reported on 5/9/2024), Disp: 90 tablet, Rfl: 3     zolpidem (AMBIEN) 10 MG tablet, Take tablet by mouth 15 minutes prior to sleep, for Sleep Study (Patient not taking: Reported on 5/9/2024), Disp: 1 tablet, Rfl: 0  Immunization History   Administered Date(s) Administered     COVID-19 12+ (2023-24) (Pfizer) 10/05/2023     COVID-19 Bivalent 18+ (Moderna) 10/27/2022     COVID-19 Monovalent 18+ (Moderna) 04/06/2021, 05/04/2021, 11/15/2021     DTAP (<7y) 1993, 01/11/1994, 04/26/1994, 08/10/1995     Flu, Unspecified 09/16/2021     HEPA 09/01/2012     HIB (PRP-T) 1993, 01/11/1994, 04/26/1994, 10/31/1994     HIB, Unspecified 1993, 01/11/1994, 04/26/1994, 10/31/1994     HepB 08/22/2006, 09/01/2012     Hepatitis A (ADULT 19+) 09/01/2012, 10/03/2018     Hepatitis B, Adult 09/01/2012, 10/03/2018     Hepatitis B, Peds 08/22/2006     Influenza (intradermal) 09/04/2012     Influenza Vaccine >6 months,quad, PF 10/10/2018, 02/05/2021     Influenza Vaccine, 6+MO IM (QUADRIVALENT W/PRESERVATIVES) 09/04/2012     Influenza,INJ,MDCK,PF,Quad >6mo(Flucelvax) 10/25/2022, 10/05/2023     Japanese Encephalitis IM 10/03/2018, 10/10/2018     MMR 10/31/1994, 08/22/2006      "Meningococcal (Menomune ) 08/31/2009     Meningococcal ACWY (Menactra ) 08/31/2009     Pneumococcal 23 valent 05/09/2024     Polio, Unspecified 1993, 01/11/1994, 08/10/1995     Poliovirus, inactivated (IPV) 1993, 01/11/1994, 08/10/1995, 09/01/2012     Rabies - IM Diploid Cell Culture 10/03/2018, 10/10/2018, 10/24/2018     TD,PF 7+ (Tenivac) 08/22/2006, 09/01/2012     TDAP Vaccine (Adacel) 08/22/2006, 09/01/2012, 10/24/2018     Typhoid IM 09/01/2012, 10/10/2018     Typhoid-h-p 09/01/2012     Yellow Fever 09/01/2012     No Known Allergies  OBJECTIVE:                                                                 /78 (BP Location: Left arm, Patient Position: Sitting, Cuff Size: Adult Regular)   Pulse 71   Temp (!) 96  F (35.6  C) (Tympanic)   Ht 1.911 m (6' 3.25\")   Wt 100.1 kg (220 lb 9.6 oz)   SpO2 99%   BMI 27.39 kg/m          Physical Exam  Vitals and nursing note reviewed.   Constitutional:       General: He is not in acute distress.     Appearance: He is not diaphoretic.   HENT:      Head: Normocephalic and atraumatic.      Right Ear: Tympanic membrane, ear canal and external ear normal.      Left Ear: Tympanic membrane, ear canal and external ear normal.      Nose: Mucosal edema present. No rhinorrhea.      Right Turbinates: Enlarged.      Left Turbinates: Enlarged.      Mouth/Throat:      Lips: Pink.      Mouth: Mucous membranes are moist.      Pharynx: Oropharynx is clear. No pharyngeal swelling, oropharyngeal exudate or posterior oropharyngeal erythema.   Eyes:      General:         Right eye: No discharge.         Left eye: No discharge.      Conjunctiva/sclera: Conjunctivae normal.   Cardiovascular:      Rate and Rhythm: Normal rate and regular rhythm.      Heart sounds: Normal heart sounds. No murmur heard.  Pulmonary:      Effort: Pulmonary effort is normal. No respiratory distress.      Breath sounds: Normal breath sounds and air entry. No stridor, decreased air movement or " transmitted upper airway sounds. No decreased breath sounds, wheezing, rhonchi or rales.   Musculoskeletal:         General: Normal range of motion.   Neurological:      Mental Status: He is alert and oriented to person, place, and time.   Psychiatric:         Mood and Affect: Mood normal.         Behavior: Behavior normal.           WORKUP:   SPIROMETRY       FVC 5.78L (91% of predicted).     FEV1 4.42L (86% of predicted).     FEV1/FVC 76%      I have reviewed and interpreted these results. My interpretation:The office spirometry performed today doesn't suggest an obstruction.     Asthma Control Test (ACT) total score: 21            ASSESSMENT/PLAN:           Moderate persistent asthma without complication  Improved with Symbicort 160/4.5 mcg 2 puffs twice daily, omeprazole 20 mg by mouth once daily, and albuterol as needed.  - Continue as is.  - At some point, we will need to try stopping the omeprazole.    - BREATHING CAPACITY TEST [14383]  - budesonide-formoterol (SYMBICORT) 160-4.5 MCG/ACT Inhaler  Dispense: 10.2 g; Refill: 5    Chronic pansinusitis  Rhinitis symptoms improved with a combination of intranasal fluticasone and azelastine.  They are planning surgery for pansinusitis.  - Start budesonide/saline irrigations as prescribed by Dr. Alvarez.  - Once he starts budesonide/saline irrigations, stop intranasal fluticasone.  - Continue azelastine sprays in each nostril twice daily as needed.       Follow up in 3 months or sooner if needed.    Thank you for allowing us to participate in the care of this patient. Please feel free to contact us if there are any questions or concerns about the patient.    Disclaimer: This note consists of symbols derived from keyboarding, dictation and/or voice recognition software. As a result, there may be errors in the script that have gone undetected. Please consider this when interpreting information found in this chart.    Paramjit Valdivia MD, FAAAAI, FACAAI  Allergy, Asthma  and Immunology     [facility]        Again, thank you for allowing me to participate in the care of your patient.        Sincerely,        Paramjit Valdivia MD

## 2024-06-20 NOTE — PATIENT INSTRUCTIONS
Moderate persistent asthma without complication  Improved with Symbicort 160/4.5 mcg 2 puffs twice daily, omeprazole 20 mg by mouth once daily, and albuterol as needed.  - Continue as is.  - At some point, we will need to try stopping the omeprazole.      Chronic pansinusitis  Rhinitis symptoms improved with a combination of intranasal fluticasone and azelastine.    - Start budesonide/saline irrigations as prescribed by Dr. Alvarez.  - Once you start budesonide/saline irrigations, stop intranasal fluticasone.  - Continue azelastine sprays in each nostril twice daily as needed.        Prescription Assistance  If you need assistance with your prescriptions (cost, coverage, etc) please contact: [place] [facility] [phone]           If labs have been ordered/completed, please allow 7-14 business days for final interpretation of results to be sent on My Chart, phone or mail. Some lab results can take up to 28 days for results.         Allergy Staff Appt Hours Shot Hours Locations    Physician      Paramjit Valdivia MD         Support Staff      Senait Mosqueda MA     Tuesday:   [place] :  [place]: :         :  [facility] 7-3     [place]        Tuesday: - :20        Wednesday: :: 7-4:10        Tuesday: 7-:10        Thursday: 7-3:10     [facility]       Tues & Wed: :10       Thurs: 12-4:10       Fri:             [facility]  [address] [place] [address]  Appt Line: [phone]        [facility]  [address] [place] [address]  Appt Line: [phone]     Pulmonary Function Scheduling:  [place]: [phone]  [place]: [phone]  [phone]

## 2024-06-20 NOTE — PROGRESS NOTES
SUBJECTIVE:                                                                   Raj Smith presents today to our Allergy Clinic at [facility] for a follow up visit. He is a 29 year old male with history of asthma, chronic rhinitis and chronic rhinosinusitis.    In his late 20s, the patient started having episodes of dyspnea, wheezing, and sometimes cough.  Triggered by exertion, dust exposure, smoke, cold air, and viral respiratory infections.  It was a perennial pattern of symptoms.  Possibly, in winter, he felt worse.  In December 2023, pulmonary function test with mild obstruction.  Postbronchodilator response was noted.  Started Qvar 80 mcg which remarkably improved his symptoms.  He still could not exercise properly without using albuterol.  He still would experience nocturnal awakening due to dyspnea and wheezing about once a week.  The patient was complaining of a daily heartburn.  In May 2024, spirometry suggested mild obstruction.    The patient also mentioned long-term history of nasal congestion, rhinorrhea, sneezing, and poor sense of smell.  When I saw the patient in May 2024, I was unable to differentiate between nasal polyposis versus polypoid degeneration.    In May 2024, CBC with differential within normal limits.  Absolute eosinophil count 300.  Total serum IgE within normal limits.  Serum IgE for the regional aeroallergen panel with a slight sensitivity to tree and weed pollen.    The patient recently saw Dr. Alvarez.  Dr. Alvarez send me a note today (June 20, 2024) sinus CT showed significant CRS without nasal polyps.  Sinus surgery was recommended.    Chronic rhinosinusitis without nasal polyposis    The patient brought a sinus CT interpretation showing a deviation of the nasal septum to the left anteriorly and a spur posteriorly, with hypertrophy of turbinates and moderate inflammatory changes involving all sinuses. He is planning to undergo sinus  surgery.    Yesterday, he received a triamcinolone injection, which he feels has already been helpful. He continues using intranasal fluticasone and azelastine and reports that nasal congestion, rhinorrhea, and sneezing have reduced. However, his sense of smell is still completely absent. He was prescribed budesonide/saline irrigations but has not filled the prescription yet.    Asthma    The patient's breathing has improved after switching from Qvar to Symbicort and starting omeprazole. He uses Symbicort 160/4.5 mcg, 2 puffs twice daily, and has needed albuterol only once or twice since then. Nocturnal symptoms have almost resolved, and exercise tolerance has improved. He experiences shortness of breath only once or twice a week, whereas in the past, it occurred all the time. He no longer feels lightheaded with exertion.            Patient Active Problem List   Diagnosis    Injury of head, subsequent encounter    Gastroesophageal reflux disease without esophagitis       Past Medical History:   Diagnosis Date    Gastroesophageal reflux disease without esophagitis 2/16/2023      Problem (# of Occurrences) Relation (Name,Age of Onset)    Asthma (1) Brother    Other Cancer (2) Maternal Grandmother: brain tumor, Maternal Grandfather: brain tumor           Negative family history of: Diabetes, Hypertension, Prostate Cancer, Colon Cancer, Breast Cancer          Past Surgical History:   Procedure Laterality Date    COLONOSCOPY N/A 3/3/2021    Procedure: Colonoscopy, With Polypectomy And Biopsy;  Surgeon: Josemanuel Alexander MD;  Location:  OR    COLONOSCOPY WITH CO2 INSUFFLATION N/A 3/3/2021    Procedure: COLONOSCOPY, WITH CO2 INSUFFLATION;  Surgeon: Josemanuel Alexander MD;  Location:  OR    COMBINED ESOPHAGOSCOPY, GASTROSCOPY, DUODENOSCOPY (EGD) WITH CO2 INSUFFLATION N/A 3/3/2021    Procedure: ESOPHAGOGASTRODUODENOSCOPY, WITH CO2 INSUFFLATION;  Surgeon: Josemanuel Alexander MD;  Location:  OR     ESOPHAGOSCOPY, GASTROSCOPY, DUODENOSCOPY (EGD), COMBINED N/A 3/3/2021    Procedure: Esophagogastroduodenoscopy, With Biopsy;  Surgeon: Josemanuel Alexander MD;  Location: [facility] OR    NO HISTORY OF SURGERY       Social History     Socioeconomic History    Marital status:      Spouse name: None    Number of children: 0    Years of education: None    Highest education level: None   Occupational History    Occupation: [employer]   Tobacco Use    Smoking status: Never     Passive exposure: Never    Smokeless tobacco: Never   Vaping Use    Vaping status: Never Used   Substance and Sexual Activity    Alcohol use: Yes     Comment: 1 beer a day 5-7 drinks per week    Drug use: No    Sexual activity: Yes     Partners: Female   Other Topics Concern    Parent/sibling w/ CABG, MI or angioplasty before 65F 55M? No   Social History Narrative    06/20/24        ENVIRONMENTAL HISTORY: [place] The home is heated with a forced air. They does have central air conditioning. The patient's bedroom is furnished with feather/wool bedding or pillows, hard jazzmine in bedroom, and fabric window coverings.  Pets inside the house include 1 dog(s). There no history of cockroach or mice infestation. There is/are 0 smokers in the house.  The house does have a damp basement.              Current Outpatient Medications:     azelastine (ASTELIN) 0.1 % nasal spray, Spray 2 sprays into both nostrils 2 times daily as needed for rhinitis, Disp: 30 mL, Rfl: 3    budesonide (PULMICORT) 0.5 MG/2ML neb solution, Take 0.5 mg by nebulization daily Pour in nedipot, Disp: , Rfl:     budesonide-formoterol (SYMBICORT) 160-4.5 MCG/ACT Inhaler, Inhale 2 puffs into the lungs 2 times daily, Disp: 10.2 g, Rfl: 5    fluticasone (FLONASE) 50 MCG/ACT nasal spray, Spray 2 sprays into both nostrils daily, Disp: 16 g, Rfl: 3    omeprazole (PRILOSEC) 20 MG DR capsule, Take 1 capsule (20 mg) by mouth daily, Disp: 90  capsule, Rfl: 3    albuterol (PROAIR HFA/PROVENTIL HFA/VENTOLIN HFA) 108 (90 Base) MCG/ACT inhaler, Inhale 2 puffs into the lungs every 6 hours as needed for shortness of breath, wheezing or cough (Patient not taking: Reported on 5/9/2024), Disp: 36 g, Rfl: 3    azelastine (ASTELIN) 0.1 % nasal spray, Spray 1 spray into both nostrils 2 times daily (Patient not taking: Reported on 6/20/2024), Disp: , Rfl:     cetirizine (ZYRTEC) 10 MG tablet, Take 1 tablet (10 mg) by mouth daily (Patient not taking: Reported on 5/9/2024), Disp: 90 tablet, Rfl: 3    zolpidem (AMBIEN) 10 MG tablet, Take tablet by mouth 15 minutes prior to sleep, for Sleep Study (Patient not taking: Reported on 5/9/2024), Disp: 1 tablet, Rfl: 0  Immunization History   Administered Date(s) Administered    COVID-19 12+ (2023-24) (Pfizer) 10/05/2023    COVID-19 Bivalent 18+ (Moderna) 10/27/2022    COVID-19 Monovalent 18+ (Moderna) 04/06/2021, 05/04/2021, 11/15/2021    DTAP (<7y) 1993, 01/11/1994, 04/26/1994, 08/10/1995    Flu, Unspecified 09/16/2021    HEPA 09/01/2012    HIB (PRP-T) 1993, 01/11/1994, 04/26/1994, 10/31/1994    HIB, Unspecified 1993, 01/11/1994, 04/26/1994, 10/31/1994    HepB 08/22/2006, 09/01/2012    Hepatitis A (ADULT 19+) 09/01/2012, 10/03/2018    Hepatitis B, Adult 09/01/2012, 10/03/2018    Hepatitis B, Peds 08/22/2006    Influenza (intradermal) 09/04/2012    Influenza Vaccine >6 months,quad, PF 10/10/2018, 02/05/2021    Influenza Vaccine, 6+MO IM (QUADRIVALENT W/PRESERVATIVES) 09/04/2012    Influenza,INJ,MDCK,PF,Quad >6mo(Flucelvax) 10/25/2022, 10/05/2023    Japanese Encephalitis IM 10/03/2018, 10/10/2018    MMR 10/31/1994, 08/22/2006    Meningococcal (Menomune ) 08/31/2009    Meningococcal ACWY (Menactra ) 08/31/2009    Pneumococcal 23 valent 05/09/2024    Polio, Unspecified 1993, 01/11/1994, 08/10/1995    Poliovirus, inactivated (IPV) 1993, 01/11/1994, 08/10/1995, 09/01/2012    Rabies - IM Diploid Cell  "Culture 10/03/2018, 10/10/2018, 10/24/2018    TD,PF 7+ (Tenivac) 08/22/2006, 09/01/2012    TDAP Vaccine (Adacel) 08/22/2006, 09/01/2012, 10/24/2018    Typhoid IM 09/01/2012, 10/10/2018    Typhoid-h-p 09/01/2012    Yellow Fever 09/01/2012     No Known Allergies  OBJECTIVE:                                                                 /78 (BP Location: Left arm, Patient Position: Sitting, Cuff Size: Adult Regular)   Pulse 71   Temp (!) 96  F (35.6  C) (Tympanic)   Ht 1.911 m (6' 3.25\")   Wt 100.1 kg (220 lb 9.6 oz)   SpO2 99%   BMI 27.39 kg/m          Physical Exam  Vitals and nursing note reviewed.   Constitutional:       General: He is not in acute distress.     Appearance: He is not diaphoretic.   HENT:      Head: Normocephalic and atraumatic.      Right Ear: Tympanic membrane, ear canal and external ear normal.      Left Ear: Tympanic membrane, ear canal and external ear normal.      Nose: Mucosal edema present. No rhinorrhea.      Right Turbinates: Enlarged.      Left Turbinates: Enlarged.      Mouth/Throat:      Lips: Pink.      Mouth: Mucous membranes are moist.      Pharynx: Oropharynx is clear. No pharyngeal swelling, oropharyngeal exudate or posterior oropharyngeal erythema.   Eyes:      General:         Right eye: No discharge.         Left eye: No discharge.      Conjunctiva/sclera: Conjunctivae normal.   Cardiovascular:      Rate and Rhythm: Normal rate and regular rhythm.      Heart sounds: Normal heart sounds. No murmur heard.  Pulmonary:      Effort: Pulmonary effort is normal. No respiratory distress.      Breath sounds: Normal breath sounds and air entry. No stridor, decreased air movement or transmitted upper airway sounds. No decreased breath sounds, wheezing, rhonchi or rales.   Musculoskeletal:         General: Normal range of motion.   Neurological:      Mental Status: He is alert and oriented to person, place, and time.   Psychiatric:         Mood and Affect: Mood normal.         " Behavior: Behavior normal.           WORKUP:   SPIROMETRY       FVC 5.78L (91% of predicted).     FEV1 4.42L (86% of predicted).     FEV1/FVC 76%      I have reviewed and interpreted these results. My interpretation:The office spirometry performed today doesn't suggest an obstruction.     Asthma Control Test (ACT) total score: 21            ASSESSMENT/PLAN:           Moderate persistent asthma without complication  Improved with Symbicort 160/4.5 mcg 2 puffs twice daily, omeprazole 20 mg by mouth once daily, and albuterol as needed.  - Continue as is.  - At some point, we will need to try stopping the omeprazole.    - BREATHING CAPACITY TEST [45651]  - budesonide-formoterol (SYMBICORT) 160-4.5 MCG/ACT Inhaler  Dispense: 10.2 g; Refill: 5    Chronic pansinusitis  Rhinitis symptoms improved with a combination of intranasal fluticasone and azelastine.  They are planning surgery for pansinusitis.  - Start budesonide/saline irrigations as prescribed by Dr. Alvarez.  - Once he starts budesonide/saline irrigations, stop intranasal fluticasone.  - Continue azelastine sprays in each nostril twice daily as needed.       Follow up in 3 months or sooner if needed.    Thank you for allowing us to participate in the care of this patient. Please feel free to contact us if there are any questions or concerns about the patient.    Disclaimer: This note consists of symbols derived from keyboarding, dictation and/or voice recognition software. As a result, there may be errors in the script that have gone undetected. Please consider this when interpreting information found in this chart.    Paramjit Valdivia MD, FAAAAI, FACAAI  Allergy, Asthma and Immunology     [facility]

## 2024-06-24 NOTE — RESULT ENCOUNTER NOTE
I have reviewed and interpreted these results. My interpretation:The office spirometry performed today doesn't suggest an obstruction.

## 2024-07-16 DIAGNOSIS — J45.40 MODERATE PERSISTENT ASTHMA WITHOUT COMPLICATION: ICD-10-CM

## 2024-07-16 DIAGNOSIS — J30.0 CHRONIC VASOMOTOR RHINITIS: ICD-10-CM

## 2024-07-16 RX ORDER — BUDESONIDE 0.5 MG/2ML
0.5 INHALANT ORAL DAILY
Status: CANCELLED | OUTPATIENT
Start: 2024-07-16

## 2024-07-16 RX ORDER — BUDESONIDE AND FORMOTEROL FUMARATE DIHYDRATE 160; 4.5 UG/1; UG/1
2 AEROSOL RESPIRATORY (INHALATION) 2 TIMES DAILY
Qty: 10.2 G | Refills: 5 | Status: SHIPPED | OUTPATIENT
Start: 2024-07-16

## 2024-07-16 RX ORDER — FLUTICASONE PROPIONATE 50 MCG
2 SPRAY, SUSPENSION (ML) NASAL DAILY
Qty: 16 G | Refills: 3 | Status: SHIPPED | OUTPATIENT
Start: 2024-07-16

## 2024-07-16 NOTE — TELEPHONE ENCOUNTER
Fax received from [facility] stating pt is requesting that Flonase and Symbicort Rx be sent. Last Rx for both were sent on 6/20/2024 and 5/9/2024 to [facility].     Resent Rx to [facility] as requested.     Senait PRABHAKAR RN  Specialty/Allergy Clinics

## 2024-07-16 NOTE — TELEPHONE ENCOUNTER
Requested Prescriptions   Pending Prescriptions Disp Refills    budesonide (PULMICORT) 0.5 MG/2ML neb solution       Sig: Take 2 mLs (0.5 mg) by nebulization daily Pour in nedipot       There is no refill protocol information for this order        Last office visit: Visit date not found ; last virtual visit: Visit date not found with prescribing provider:  rayna Freeman   Future Office Visit:        Racheal Pratt   Clinic Station    [facility]  [phone]

## 2024-07-17 NOTE — TELEPHONE ENCOUNTER
Has he completed the medication prescribed by Dr. Alvarez? When I saw the patient in June, he mentioned that he did not fill that prescription.    Paramjit Valdivia MD

## 2024-08-14 ENCOUNTER — PATIENT OUTREACH (OUTPATIENT)
Dept: CARE COORDINATION | Facility: CLINIC | Age: 29
End: 2024-08-14
Payer: COMMERCIAL

## 2024-08-21 ENCOUNTER — MYC MEDICAL ADVICE (OUTPATIENT)
Dept: SLEEP MEDICINE | Facility: CLINIC | Age: 29
End: 2024-08-21

## 2024-11-01 ENCOUNTER — OFFICE VISIT (OUTPATIENT)
Dept: FAMILY MEDICINE | Facility: CLINIC | Age: 29
End: 2024-11-01
Payer: COMMERCIAL

## 2024-11-01 VITALS
HEIGHT: 74 IN | BODY MASS INDEX: 28.39 KG/M2 | WEIGHT: 221.2 LBS | DIASTOLIC BLOOD PRESSURE: 77 MMHG | RESPIRATION RATE: 16 BRPM | OXYGEN SATURATION: 98 % | SYSTOLIC BLOOD PRESSURE: 117 MMHG | TEMPERATURE: 97.7 F | HEART RATE: 65 BPM

## 2024-11-01 DIAGNOSIS — Z01.818 PREOP GENERAL PHYSICAL EXAM: Primary | ICD-10-CM

## 2024-11-01 DIAGNOSIS — J45.40 MODERATE PERSISTENT ASTHMA, UNSPECIFIED WHETHER COMPLICATED: ICD-10-CM

## 2024-11-01 DIAGNOSIS — Z23 INFLUENZA VACCINE NEEDED: ICD-10-CM

## 2024-11-01 PROCEDURE — 90656 IIV3 VACC NO PRSV 0.5 ML IM: CPT | Performed by: NURSE PRACTITIONER

## 2024-11-01 PROCEDURE — 99214 OFFICE O/P EST MOD 30 MIN: CPT | Mod: 25 | Performed by: NURSE PRACTITIONER

## 2024-11-01 PROCEDURE — 90471 IMMUNIZATION ADMIN: CPT | Performed by: NURSE PRACTITIONER

## 2024-11-01 ASSESSMENT — PAIN SCALES - GENERAL: PAINLEVEL_OUTOF10: NO PAIN (0)

## 2024-11-01 NOTE — LETTER
My Asthma Action Plan    Name: Raj Smith   YOB: 1995  Date: 11/1/2024   My doctor: RIVERA San CNP   My clinic: [facility]        My Rescue Medicine:   Albuterol inhaler (Proair/Ventolin/Proventil HFA)  2-4 puffs EVERY 4 HOURS as needed. Use a spacer if recommended by your provider.   My Asthma Severity:   Mild Persistent  Know your asthma triggers: cold air             GREEN ZONE   Good Control  I feel good  No cough or wheeze  Can work, sleep and play without asthma symptoms       Take your asthma control medicine every day.     If exercise triggers your asthma, take your rescue medication  15 minutes before exercise or sports, and  During exercise if you have asthma symptoms  Spacer to use with inhaler: If you have a spacer, make sure to use it with your inhaler             YELLOW ZONE Getting Worse  I have ANY of these:  I do not feel good  Cough or wheeze  Chest feels tight  Wake up at night   Keep taking your Green Zone medications  Start taking your rescue medicine:  every 20 minutes for up to 1 hour. Then every 4 hours for 24-48 hours.  If you stay in the Yellow Zone for more than 12-24 hours, contact your doctor.  If you do not return to the Green Zone in 12-24 hours or you get worse, start taking your oral steroid medicine if prescribed by your provider.           RED ZONE Medical Alert - Get Help  I have ANY of these:  I feel awful  Medicine is not helping  Breathing getting harder  Trouble walking or talking  Nose opens wide to breathe       Take your rescue medicine NOW  If your provider has prescribed an oral steroid medicine, start taking it NOW  Call your doctor NOW  If you are still in the Red Zone after 20 minutes and you have not reached your doctor:  Take your rescue medicine again and  Call 911 or go to the emergency room right away    See your regular doctor within 2 weeks of an Emergency Room or Urgent Care visit for follow-up treatment.           Annual Reminders:  Meet with Asthma Educator,  Flu Shot in the Fall, consider Pneumonia Vaccination for patients with asthma (aged 19 and older).    Pharmacy:    [facility] - [place], [place] - [address].  [id number] - [place], [place] - [address] OF [address] [place] [address] [id number] - [place], [place] - [address]  [facility] - [address]    Electronically signed by RIVERA San CNP   Date: 11/01/24                    Asthma Triggers  How To Control Things That Make Your Asthma Worse    Triggers are things that make your asthma worse.  Look at the list below to help you find your triggers and   what you can do about them. You can help prevent asthma flare-ups by staying away from your triggers.      Trigger                                                          What you can do   Cigarette Smoke  Tobacco smoke can make asthma worse. Do not allow smoking in your home, car or around you.  Be sure no one smokes at a child s day care or school.  If you smoke, ask your health care provider for ways to help you quit.  Ask family members to quit too.  Ask your health care provider for a referral to Quit Plan to help you quit smoking, or call 3-545-873-PLAN.     Colds, Flu, Bronchitis  These are common triggers of asthma. Wash your hands often.  Don t touch your eyes, nose or mouth.  Get a flu shot every year.     Dust Mites  These are tiny bugs that live in cloth or carpet. They are too small to see. Wash sheets and blankets in hot water every week.   Encase pillows and mattress in dust mite proof covers.  Avoid having carpet if you can. If you have carpet, vacuum weekly.   Use a dust mask and HEPA vacuum.   Pollen and Outdoor Mold  Some people are allergic to trees, grass, or weed pollen, or molds. Try to keep your windows closed.  Limit time out doors when pollen count is high.   Ask  you health care provider about taking medicine during allergy season.     Animal Dander  Some people are allergic to skin flakes, urine or saliva from pets with fur or feathers. Keep pets with fur or feathers out of your home.    If you can t keep the pet outdoors, then keep the pet out of your bedroom.  Keep the bedroom door closed.  Keep pets off cloth furniture and away from stuffed toys.     Mice, Rats, and Cockroaches  Some people are allergic to the waste from these pests.   Cover food and garbage.  Clean up spills and food crumbs.  Store grease in the refrigerator.   Keep food out of the bedroom.   Indoor Mold  This can be a trigger if your home has high moisture. Fix leaking faucets, pipes, or other sources of water.   Clean moldy surfaces.  Dehumidify basement if it is damp and smelly.   Smoke, Strong Odors, and Sprays  These can reduce air quality. Stay away from strong odors and sprays, such as perfume, powder, hair spray, paints, smoke incense, paint, cleaning products, candles and new carpet.   Exercise or Sports  Some people with asthma have this trigger. Be active!  Ask your doctor about taking medicine before sports or exercise to prevent symptoms.    Warm up for 5-10 minutes before and after sports or exercise.     Other Triggers of Asthma  Cold air:  Cover your nose and mouth with a scarf.  Sometimes laughing or crying can be a trigger.  Some medicines and food can trigger asthma.

## 2024-11-01 NOTE — PROGRESS NOTES
Preoperative Evaluation  [facility]  [address] [place] [address]  [place] [address]  Phone: [phone]  Primary Provider: Kaylee Szymanski MD  Pre-op Performing Provider: RIVERA San CNP  Nov 1, 2024             10/31/2024   Surgical Information   What procedure is being done? Sinus endoscopy with video and image guidance, bilateral maxillary antrostomy, bilateral total  ethmoidectomy,  bilateral sphenoidotomy, left frontal sinusotomy, septoplasty, submucus resection of inferior turbinates and endoscopic of darryl bullosa    Facility or Hospital where procedure/surgery will be performed: Surgical specialty center [facility]    Who is doing the procedure / surgery? Pete Alvarez    Date of surgery / procedure: 11/11/2024    Time of surgery / procedure: 12:30pm    Where do you plan to recover after surgery? at home with family        Patient-reported     Fax number for surgical facility: Note does not need to be faxed, will be available electronically in Epic.    Assessment & Plan     The proposed surgical procedure is considered INTERMEDIATE risk.    (Z01.818) Preop general physical exam  (primary encounter diagnosis)  Comment: Presently Clinically Stable for Scheduled Surgery.   Plan: REVIEW OF HEALTH MAINTENANCE PROTOCOL ORDERS  -Avoidance of; Aspirin, Ibuprofen, Naproxen, and other NSAIDS 5-7 days prior to surgery.   -To call with any changes in present status       Possible Sleep Apnea: No           - No identified additional risk factors other than previously addressed    Antiplatelet or Anticoagulation Medication Instructions   - Patient is on no antiplatelet or anticoagulation medications.    Additional Medication Instructions   - LABA, inhaled corticosteroid, long-acting anticholinergics: Continue without modification.   - rescue Inhaler: Continue PRN. Bring to hospital on the day of surgery.    Recommendation  Approval given to proceed with proposed procedure, without  further diagnostic evaluation.      (J45.40) Moderate persistent asthma, unspecified whether complicated  Comment: stable  PLAN:   -Continue to monitor the patient's asthma and ensure he has access to his rescue inhaler on the day of surgery.    (Z23) Influenza vaccine needed  Plan: INFLUENZA VACCINE,SPLIT         VIRUS,TRIVALENT,PF(FLUZONE)  Larisa Anthony is a 29 year old, presenting for the following:  Pre-Op Exam          11/1/2024     9:48 AM   Additional Questions   Roomed by marc Bradley ma   Accompanied by self     HPI related to upcoming procedure:     Raj Smith is  a 29-year-old male with a history of asthma who is scheduled to undergo nasal and sinus surgery. He reports no complications from previous anesthesia experiences. The patient has no active cardiac conditions and describes his functional status as good. He denies experiencing any chest pain, shortness of breath, or palpitations at this time.       10/31/2024   Pre-Op Questionnaire   Have you ever had a heart attack or stroke? No    Have you ever had surgery on your heart or blood vessels, such as a stent placement, a coronary artery bypass, or surgery on an artery in your head, neck, heart, or legs? No    Do you have chest pain with activity? No    Do you have a history of heart failure? No    Do you currently have a cold, bronchitis or symptoms of other infection? No    Do you have a cough, shortness of breath, or wheezing? No    Do you or anyone in your family have previous history of blood clots? No    Do you or does anyone in your family have a serious bleeding problem such as prolonged bleeding following surgeries or cuts? No    Have you ever had problems with anemia or been told to take iron pills? No    Have you had any abnormal blood loss such as black, tarry or bloody stools? No    Have you ever had a blood transfusion? No    Are you willing to have a blood transfusion if it is medically needed before, during, or after your  surgery? Yes    Have you or any of your relatives ever had problems with anesthesia? (!) UNKNOWN     Do you have sleep apnea, excessive snoring or daytime drowsiness? (!) UNKNOWN    Do you have any artifical heart valves or other implanted medical devices like a pacemaker, defibrillator, or continuous glucose monitor? No    Do you have artificial joints? No    Are you allergic to latex? No        Patient-reported     Health Care Directive  Patient does not have a Health Care Directive: Discussed advance care planning with patient; however, patient declined at this time.    Preoperative Review of    reviewed - no record of controlled substances prescribed.      Status of Chronic Conditions:  ASTHMA - Patient has a longstanding history of moderate-severe Asthma . Patient has been doing well overall noting NO SYMPTOMS and continues on medication regimen consisting of Symbicort and  without adverse reactions or side effects.     Patient Active Problem List    Diagnosis Date Noted    Gastroesophageal reflux disease without esophagitis 02/16/2023     Priority: Medium    Moderate persistent asthma 12/07/2022     Priority: Medium     Getting it treated      Injury of head, subsequent encounter 02/06/2021     Priority: Medium      Past Medical History:   Diagnosis Date    Gastroesophageal reflux disease without esophagitis 2/16/2023     Past Surgical History:   Procedure Laterality Date    COLONOSCOPY N/A 3/3/2021    Procedure: Colonoscopy, With Polypectomy And Biopsy;  Surgeon: Josemanuel Alexander MD;  Location: [facility] OR    COLONOSCOPY WITH CO2 INSUFFLATION N/A 3/3/2021    Procedure: COLONOSCOPY, WITH CO2 INSUFFLATION;  Surgeon: Josemanuel Alexander MD;  Location: [facility] OR    COMBINED ESOPHAGOSCOPY, GASTROSCOPY, DUODENOSCOPY (EGD) WITH CO2 INSUFFLATION N/A 3/3/2021    Procedure: ESOPHAGOGASTRODUODENOSCOPY, WITH CO2 INSUFFLATION;  Surgeon: Josemanuel Alexander MD;  Location: [facility] OR    ESOPHAGOSCOPY,  GASTROSCOPY, DUODENOSCOPY (EGD), COMBINED N/A 3/3/2021    Procedure: Esophagogastroduodenoscopy, With Biopsy;  Surgeon: Josemanuel Alexander MD;  Location:  OR    NO HISTORY OF SURGERY       Current Outpatient Medications   Medication Sig Dispense Refill    azelastine (ASTELIN) 0.1 % nasal spray Spray 2 sprays into both nostrils 2 times daily as needed for rhinitis 30 mL 3    budesonide (PULMICORT) 0.5 MG/2ML neb solution Mix respule of 0.5mg/2 mL budesonide vial in 8oz normal saline sinus rinse bottle. Irrigate each nostril with half of the bottle twice daily 120 mL 2    budesonide-formoterol (SYMBICORT) 160-4.5 MCG/ACT Inhaler Inhale 2 puffs into the lungs 2 times daily 10.2 g 5    albuterol (PROAIR HFA/PROVENTIL HFA/VENTOLIN HFA) 108 (90 Base) MCG/ACT inhaler Inhale 2 puffs into the lungs every 6 hours as needed for shortness of breath, wheezing or cough (Patient not taking: Reported on 11/1/2024) 36 g 3       No Known Allergies     Social History     Tobacco Use    Smoking status: Never     Passive exposure: Never    Smokeless tobacco: Never   Substance Use Topics    Alcohol use: Yes     Comment: 1 beer a day 5-7 drinks per week     Family History   Problem Relation Age of Onset    Asthma Brother     Other Cancer Maternal Grandmother         brain tumor    Other Cancer Maternal Grandfather         brain tumor    Diabetes No family hx of     Hypertension No family hx of     Prostate Cancer No family hx of     Colon Cancer No family hx of     Breast Cancer No family hx of      History   Drug Use No             Review of Systems  CONSTITUTIONAL: NEGATIVE for fever, chills, change in weight  INTEGUMENTARY/SKIN: NEGATIVE for worrisome rashes, moles or lesions  EYES: NEGATIVE for vision changes or irritation  ENT/MOUTH: NEGATIVE for ear, mouth and throat problems  RESP: NEGATIVE for significant cough or SOB  BREAST: NEGATIVE for masses, tenderness or discharge  CV: NEGATIVE for chest pain, palpitations or  "peripheral edema  GI: NEGATIVE for nausea, abdominal pain, heartburn, or change in bowel habits  : NEGATIVE for frequency, dysuria, or hematuria  MUSCULOSKELETAL: NEGATIVE for significant arthralgias or myalgia  NEURO: NEGATIVE for weakness, dizziness or paresthesias  ENDOCRINE: NEGATIVE for temperature intolerance, skin/hair changes  HEME: NEGATIVE for bleeding problems  PSYCHIATRIC: NEGATIVE for changes in mood or affect    Objective    /77 (BP Location: Left arm, Cuff Size: Adult Large)   Pulse 65   Temp 97.7  F (36.5  C) (Temporal)   Resp 16   Ht 1.885 m (6' 2.21\")   Wt 100.3 kg (221 lb 3.2 oz)   SpO2 98%   BMI 28.24 kg/m     Estimated body mass index is 28.24 kg/m  as calculated from the following:    Height as of this encounter: 1.885 m (6' 2.21\").    Weight as of this encounter: 100.3 kg (221 lb 3.2 oz).  Physical Exam  GENERAL: alert and no distress  EYES: Eyes grossly normal to inspection, PERRL and conjunctivae and sclerae normal  HENT: ear canals and TM's normal, nose and mouth without ulcers or lesions  NECK: no adenopathy, no asymmetry, masses, or scars  RESP: lungs clear to auscultation - no rales, rhonchi or wheezes  CV: regular rate and rhythm, normal S1 S2, no S3 or S4, no murmur, click or rub, no peripheral edema  ABDOMEN: soft, nontender, no hepatosplenomegaly, no masses and bowel sounds normal  MS: no gross musculoskeletal defects noted, no edema  SKIN: no suspicious lesions or rashes  NEURO: Normal strength and tone, mentation intact and speech normal  PSYCH: mentation appears normal, affect normal/bright    Recent Labs   Lab Test 05/09/24  0927   HGB 15.0           Diagnostics  No labs were ordered during this visit.   No EKG required, no history of coronary heart disease, significant arrhythmia, peripheral arterial disease or other structural heart disease.    Revised Cardiac Risk Index (RCRI)  The patient has the following serious cardiovascular risks for perioperative " complications:   - No serious cardiac risks = 0 points     RCRI Interpretation: 1 point: Class II (low risk - 0.9% complication rate)         Signed Electronically by: RIVERA San CNP  A copy of this evaluation report is provided to the requesting physician.

## 2024-11-01 NOTE — PATIENT INSTRUCTIONS
Additional Medication Instructions   - LABA, inhaled corticosteroid, long-acting anticholinergics: Continue without modification.   - rescue Inhaler: Continue PRN. Bring to hospital on the day of surgery.    Recommendation  Approval given to proceed with proposed procedure, without further diagnostic evaluation.

## 2024-11-20 DIAGNOSIS — J32.4 CHRONIC PANSINUSITIS: ICD-10-CM

## 2024-11-20 RX ORDER — BUDESONIDE 0.5 MG/2ML
INHALANT ORAL
Qty: 360 ML | Refills: 0 | Status: SHIPPED | OUTPATIENT
Start: 2024-11-20

## 2024-11-20 NOTE — TELEPHONE ENCOUNTER
Routing refill request to provider for review/approval because:  Pt was last seen on 6/20/2024 and was to follow up in 3 months. Pt has cancelled his appointment for 11/21 and rescheduled for 1/9/2025 provider's next available opening.     Cued up 90 day supply with no refills if appropriate.      Senait PRABHAKAR RN  Specialty/Allergy Clinics

## 2024-12-02 DIAGNOSIS — J45.40 MODERATE PERSISTENT ASTHMA WITHOUT COMPLICATION: ICD-10-CM

## 2024-12-02 NOTE — TELEPHONE ENCOUNTER
Requested Prescriptions   Pending Prescriptions Disp Refills    budesonide-formoterol (SYMBICORT) 160-4.5 MCG/ACT Inhaler 10.2 g 5     Sig: Inhale 2 puffs into the lungs 2 times daily.       There is no refill protocol information for this order        Last office visit: 6/20/2024 ; last virtual visit: Visit date not found with prescribing provider:  Paramjit Valdivia      Future Office Visit:        Racheal Pratt   Clinic Station    [facility]  [phone]

## 2024-12-03 RX ORDER — BUDESONIDE AND FORMOTEROL FUMARATE DIHYDRATE 160; 4.5 UG/1; UG/1
2 AEROSOL RESPIRATORY (INHALATION) 2 TIMES DAILY
Qty: 10.2 G | Refills: 0 | Status: SHIPPED | OUTPATIENT
Start: 2024-12-03

## 2024-12-03 NOTE — TELEPHONE ENCOUNTER
Pt has follow up scheduled for 1/9/2025. Anne refill sent to get pt to appointment.     Senait PRABHAKAR RN  Specialty/Allergy Clinics

## 2024-12-10 ENCOUNTER — TELEPHONE (OUTPATIENT)
Dept: FAMILY MEDICINE | Facility: CLINIC | Age: 29
End: 2024-12-10
Payer: COMMERCIAL

## 2024-12-30 ENCOUNTER — OFFICE VISIT (OUTPATIENT)
Dept: SLEEP MEDICINE | Facility: CLINIC | Age: 29
End: 2024-12-30
Payer: COMMERCIAL

## 2024-12-30 VITALS
BODY MASS INDEX: 27.64 KG/M2 | SYSTOLIC BLOOD PRESSURE: 118 MMHG | OXYGEN SATURATION: 97 % | WEIGHT: 215.4 LBS | HEART RATE: 72 BPM | HEIGHT: 74 IN | RESPIRATION RATE: 17 BRPM | DIASTOLIC BLOOD PRESSURE: 77 MMHG

## 2024-12-30 DIAGNOSIS — G47.61 PERIODIC LIMB MOVEMENT DISORDER (PLMD): ICD-10-CM

## 2024-12-30 DIAGNOSIS — G25.81 RESTLESS LEGS SYNDROME (RLS): ICD-10-CM

## 2024-12-30 DIAGNOSIS — R06.83 SNORING: Primary | ICD-10-CM

## 2024-12-30 PROCEDURE — 99214 OFFICE O/P EST MOD 30 MIN: CPT | Performed by: NURSE PRACTITIONER

## 2024-12-30 RX ORDER — SODIUM CHLORIDE 0.65 %
1 AEROSOL, SPRAY (ML) NASAL DAILY PRN
COMMUNITY
Start: 2024-11-08

## 2024-12-30 RX ORDER — GABAPENTIN 100 MG/1
100-300 CAPSULE ORAL
Qty: 90 CAPSULE | Refills: 0 | Status: SHIPPED | OUTPATIENT
Start: 2024-12-30

## 2024-12-30 ASSESSMENT — SLEEP AND FATIGUE QUESTIONNAIRES
HOW LIKELY ARE YOU TO NOD OFF OR FALL ASLEEP IN A CAR, WHILE STOPPED FOR A FEW MINUTES IN TRAFFIC: WOULD NEVER DOZE
HOW LIKELY ARE YOU TO NOD OFF OR FALL ASLEEP WHILE SITTING INACTIVE IN A PUBLIC PLACE: SLIGHT CHANCE OF DOZING
HOW LIKELY ARE YOU TO NOD OFF OR FALL ASLEEP WHILE SITTING AND READING: MODERATE CHANCE OF DOZING
HOW LIKELY ARE YOU TO NOD OFF OR FALL ASLEEP WHILE SITTING AND TALKING TO SOMEONE: WOULD NEVER DOZE
HOW LIKELY ARE YOU TO NOD OFF OR FALL ASLEEP WHILE WATCHING TV: WOULD NEVER DOZE
HOW LIKELY ARE YOU TO NOD OFF OR FALL ASLEEP WHEN YOU ARE A PASSENGER IN A CAR FOR AN HOUR WITHOUT A BREAK: SLIGHT CHANCE OF DOZING
HOW LIKELY ARE YOU TO NOD OFF OR FALL ASLEEP WHILE LYING DOWN TO REST IN THE AFTERNOON WHEN CIRCUMSTANCES PERMIT: MODERATE CHANCE OF DOZING
HOW LIKELY ARE YOU TO NOD OFF OR FALL ASLEEP WHILE SITTING QUIETLY AFTER LUNCH WITHOUT ALCOHOL: SLIGHT CHANCE OF DOZING

## 2024-12-30 NOTE — NURSING NOTE
"Chief Complaint   Patient presents with    Study Results     PSG Results        Initial /77   Pulse 72   Resp 17   Ht 1.88 m (6' 2\")   Wt 97.7 kg (215 lb 6.4 oz)   SpO2 97%   BMI 27.66 kg/m   Estimated body mass index is 27.66 kg/m  as calculated from the following:    Height as of this encounter: 1.88 m (6' 2\").    Weight as of this encounter: 97.7 kg (215 lb 6.4 oz).    Medication Reconciliation: complete      MIKE Mi    "

## 2024-12-30 NOTE — PATIENT INSTRUCTIONS

## 2024-12-30 NOTE — PROGRESS NOTES
"Sleep Study Follow-Up Visit:    Date on this visit: 12/30/2024    Raj Smith is a 29-year-old male with a PMH pertinent for exercise-induced asthma, GERD who comes in today for follow-up of his sleep study done on 12/12/2024 at the [facility] for possible sleep apnea.            These findings were reviewed with patient.     Past medical/surgical history, family history, social history, medications and allergies were reviewed.      Problem List:  Patient Active Problem List    Diagnosis Date Noted    Gastroesophageal reflux disease without esophagitis 02/16/2023     Priority: Medium    Moderate persistent asthma 12/07/2022     Priority: Medium     Getting it treated      Injury of head, subsequent encounter 02/06/2021     Priority: Medium      Current Outpatient Medications   Medication Sig Dispense Refill    albuterol (PROAIR HFA/PROVENTIL HFA/VENTOLIN HFA) 108 (90 Base) MCG/ACT inhaler Inhale 2 puffs into the lungs every 6 hours as needed for shortness of breath, wheezing or cough 36 g 3    azelastine (ASTELIN) 0.1 % nasal spray Spray 2 sprays into both nostrils 2 times daily as needed for rhinitis 30 mL 3    budesonide-formoterol (SYMBICORT) 160-4.5 MCG/ACT Inhaler Inhale 2 puffs into the lungs 2 times daily. ALL FURTHER REFILLS AT FOLLOW UP APPOINTMENT 10.2 g 0    DEEP SEA NASAL SPRAY 0.65 % nasal spray Spray 1 spray in nostril daily as needed for congestion.      melatonin 1 MG TABS tablet Take 1 mg by mouth nightly as needed for sleep.      budesonide (PULMICORT) 0.5 MG/2ML neb solution Mix respule of 0.5mg/2 mL budesonide vial in 8oz normal saline sinus rinse bottle. Irrigate each nostril with half of the bottle twice daily (Patient not taking: Reported on 12/30/2024) 360 mL 0     No current facility-administered medications for this visit.     /77   Pulse 72   Resp 17   Ht 1.88 m (6' 2\")   Wt 97.7 kg (215 lb 6.4 oz)   SpO2 97%   BMI 27.66 kg/m  "     Impression/Plan:  Snoring  - Negative for clinically significant Obstructive Sleep Apnea.   - Sleep associated hypoxemia was not present.  Periodic limb movement disorder (PLMD)  Restless legs syndrome (RLS)  - gabapentin (NEURONTIN) 100 MG capsule; Take 1-3 capsules (100-300 mg) by mouth nightly as needed for (RLS).  Dispense: 90 capsule; Refill: 0       Treatment options discussed today including   home remedies for RLS and care information, evaluation of ferritin/iron levels, and consideration for medication treatments .    Elected treatment with combination of home remedies and trial of gabapentin 100-300 mg p.o. nightly as needed for RLS.  Patient was given instructions to start with 100 mg at bedtime for the first 3 nights, then may increase to 200 mg at bedtime if ongoing symptoms, or may increase to maximum of 300 mg at bedtime for RLS symptoms as needed.  A Rx for gabapentin was sent to his pharmacy today.    He will follow up with me in about 3 month(s) to discuss efficacy of RLS treatment with gabapentin.     39 minutes spent with patient, all of which were spent face-to-face counseling, consulting, chart review/documentation, and coordinating plan of care on the date of the encounter.      RIVERA Dowd CNP  Sleep Medicine      CC: Kaylee Szymanski     This note was written with the assistance of the Dragon voice-dictation technology software. The final document, although reviewed, may contain errors. For corrections, please contact the office.

## 2025-03-13 PROBLEM — R73.09 ELEVATED HEMOGLOBIN A1C: Status: ACTIVE | Noted: 2025-03-13

## 2025-03-13 PROBLEM — Z30.09 ENCOUNTER FOR VASECTOMY COUNSELING: Status: ACTIVE | Noted: 2025-03-13

## 2025-03-24 ENCOUNTER — VIRTUAL VISIT (OUTPATIENT)
Dept: UROLOGY | Facility: CLINIC | Age: 30
End: 2025-03-24
Attending: UROLOGY
Payer: COMMERCIAL

## 2025-03-24 DIAGNOSIS — Z30.09 ENCOUNTER FOR VASECTOMY COUNSELING: ICD-10-CM

## 2025-03-24 PROCEDURE — 1126F AMNT PAIN NOTED NONE PRSNT: CPT | Mod: 95 | Performed by: UROLOGY

## 2025-03-24 PROCEDURE — 98001 SYNCH AUDIO-VIDEO NEW LOW 30: CPT | Performed by: UROLOGY

## 2025-03-24 NOTE — PROGRESS NOTES
"Raj is a 29 year old who is being evaluated via a billable video visit.    How would you like to obtain your AVS? MyChart  If the video visit is dropped, the invitation should be resent by: Text to cell phone: [phone]  Will anyone else be joining your video visit? No          Subjective   Raj is a 29 year old, presenting for the following health issues:  Sterilization    HPI      Patient is a pleasant 29-year-old male who is interested in vasectomy.  He has 2 kids.      Review of Systems  Constitutional, neuro, ENT, endocrine, pulmonary, cardiac, gastrointestinal, genitourinary, musculoskeletal, integument and psychiatric systems are negative, except as otherwise noted.      Objective    Vitals - Patient Reported  Weight (Patient Reported): 95.3 kg (210 lb)  Height (Patient Reported): 188 cm (6' 2\")  BMI (Based on Pt Reported Ht/Wt): 26.96  Pain Score: No Pain (0)        Physical Exam   GENERAL: alert and no distress  EYES: Eyes grossly normal to inspection.  No discharge or erythema, or obvious scleral/conjunctival abnormalities.  RESP: No audible wheeze, cough, or visible cyanosis.    SKIN: Visible skin clear. No significant rash, abnormal pigmentation or lesions.  NEURO: Cranial nerves grossly intact.  Mentation and speech appropriate for age.  PSYCH: Appropriate affect, tone, and pace of words    Discussed    That vasectomy is permanent method of birth control.    That vasectomy can fail due to recanalization of the vas even many months/years later.    That he needs 2 negative sperm checks to be considered sterile    That there are other methods that are not permanent and also that the sperm can be frozen for later use.    How the technique is performed, risks of infection, bleeding, damage to the testes vessels and testes atrophy    Long term complication such as chronic and difficult to treat testes pain and questionable increase incidence of prostate cancer    That the procedure can be done at " the clinic or hospital OR        Plan:    Stop Aspirin  Will schedule Vasectomy in the future      Video-Visit Details    Type of service:  Video Visit   Originating Location (pt. Location): Home    Distant Location (provider location):  On-site  Platform used for Video Visit: Jane  Signed Electronically by: Lex Joyner MD

## 2025-03-26 ENCOUNTER — MYC MEDICAL ADVICE (OUTPATIENT)
Dept: ALLERGY | Facility: CLINIC | Age: 30
End: 2025-03-26
Payer: COMMERCIAL

## 2025-03-26 DIAGNOSIS — J45.40 MODERATE PERSISTENT ASTHMA WITHOUT COMPLICATION: ICD-10-CM

## 2025-03-26 RX ORDER — BUDESONIDE AND FORMOTEROL FUMARATE DIHYDRATE 160; 4.5 UG/1; UG/1
2 AEROSOL RESPIRATORY (INHALATION) 2 TIMES DAILY
Qty: 10.2 G | Refills: 11 | Status: SHIPPED | OUTPATIENT
Start: 2025-03-26

## 2025-03-26 NOTE — TELEPHONE ENCOUNTER
Rx sent to new pharmacy. Pt has follow up scheduled for 5/2025.    Senait PRABHAKAR RN  Specialty/Allergy Clinics

## 2025-05-22 ENCOUNTER — MYC MEDICAL ADVICE (OUTPATIENT)
Dept: FAMILY MEDICINE | Facility: CLINIC | Age: 30
End: 2025-05-22
Payer: COMMERCIAL

## 2025-05-28 ASSESSMENT — ASTHMA QUESTIONNAIRES
QUESTION_2 LAST FOUR WEEKS HOW OFTEN HAVE YOU HAD SHORTNESS OF BREATH: ONCE OR TWICE A WEEK
QUESTION_5 LAST FOUR WEEKS HOW WOULD YOU RATE YOUR ASTHMA CONTROL: WELL CONTROLLED
ACT_TOTALSCORE: 22
QUESTION_4 LAST FOUR WEEKS HOW OFTEN HAVE YOU USED YOUR RESCUE INHALER OR NEBULIZER MEDICATION (SUCH AS ALBUTEROL): ONCE A WEEK OR LESS
QUESTION_1 LAST FOUR WEEKS HOW MUCH OF THE TIME DID YOUR ASTHMA KEEP YOU FROM GETTING AS MUCH DONE AT WORK, SCHOOL OR AT HOME: NONE OF THE TIME
QUESTION_3 LAST FOUR WEEKS HOW OFTEN DID YOUR ASTHMA SYMPTOMS (WHEEZING, COUGHING, SHORTNESS OF BREATH, CHEST TIGHTNESS OR PAIN) WAKE YOU UP AT NIGHT OR EARLIER THAN USUAL IN THE MORNING: NOT AT ALL

## 2025-05-29 ENCOUNTER — OFFICE VISIT (OUTPATIENT)
Dept: ALLERGY | Facility: CLINIC | Age: 30
End: 2025-05-29
Attending: ALLERGY & IMMUNOLOGY
Payer: COMMERCIAL

## 2025-05-29 VITALS
DIASTOLIC BLOOD PRESSURE: 76 MMHG | HEART RATE: 55 BPM | WEIGHT: 211.2 LBS | OXYGEN SATURATION: 98 % | SYSTOLIC BLOOD PRESSURE: 118 MMHG | HEIGHT: 75 IN | BODY MASS INDEX: 26.26 KG/M2 | TEMPERATURE: 97.1 F

## 2025-05-29 DIAGNOSIS — J45.40 MODERATE PERSISTENT ASTHMA WITHOUT COMPLICATION: ICD-10-CM

## 2025-05-29 DIAGNOSIS — J32.4 CHRONIC PANSINUSITIS: ICD-10-CM

## 2025-05-29 LAB
EXPTIME-PRE: 6.76 SEC
FEF2575-%PRED-PRE: 79 %
FEF2575-PRE: 3.76 L/SEC
FEF2575-PRED: 4.75 L/SEC
FEFMAX-%PRED-PRE: 88 %
FEFMAX-PRE: 10.13 L/SEC
FEFMAX-PRED: 11.38 L/SEC
FEV1-%PRED-PRE: 91 %
FEV1-PRE: 4.38 L
FEV1FEV6-PRE: 78 %
FEV1FEV6-PRED: 83 %
FEV1FVC-PRE: 78 %
FEV1FVC-PRED: 82 %
FIFMAX-PRE: 4.31 L/SEC
FVC-%PRED-PRE: 96 %
FVC-PRE: 5.64 L
FVC-PRED: 5.85 L

## 2025-05-29 RX ORDER — BUDESONIDE AND FORMOTEROL FUMARATE DIHYDRATE 80; 4.5 UG/1; UG/1
2 AEROSOL RESPIRATORY (INHALATION) 2 TIMES DAILY
Qty: 10.2 G | Refills: 3 | Status: SHIPPED | OUTPATIENT
Start: 2025-05-29

## 2025-05-29 NOTE — PROGRESS NOTES
SUBJECTIVE:                                                                   Raj Smith presents today to our Allergy Clinic at [facility] [place] for a follow up visit. He is a 29 year old male with a history of asthma and chronic rhinosinusitis.    In his late 20s, the patient started having episodes of dyspnea, wheezing, and sometimes cough.  Triggered by exertion, dust exposure, smoke, cold air, and viral respiratory infections.  It was a perennial pattern of symptoms.  Possibly, in winter, he felt worse.  In December 2023, pulmonary function test with mild obstruction.  Postbronchodilator response was noted.  Started Qvar 80 mcg which remarkably improved his symptoms.  He still could not exercise properly without using albuterol.  He still would experience nocturnal awakening due to dyspnea and wheezing about once a week.  The patient was complaining of a daily heartburn.  In May 2024, spirometry suggested mild obstruction.     The patient also mentioned long-term history of nasal congestion, rhinorrhea, sneezing, and poor sense of smell.  When I saw the patient in May 2024, I was unable to differentiate between nasal polyposis versus polypoid degeneration.     In May 2024, CBC with differential within normal limits.  Absolute eosinophil count 300.  Total serum IgE within normal limits.  Serum IgE for the regional aeroallergen panel with a slight sensitivity to tree and weed pollen.     The patient recently saw Dr. Alvarez.  Dr. Alvarez send me a note on June 20, 2024 sinus CT showed significant CRS without nasal polyps.  He had a sinus surgery done in November 2024.    The patient reports no current issues with his sinuses. He uses intranasal fluticasone consistently on a daily basis and does not require oral antihistamines. He frequently uses a Neti pot, particularly during upper respiratory infections. He denies uncontrolled nasal congestion, rhinorrhea, or sneezing.    Regarding  asthma, the patient feels well-controlled on Symbicort 160/4.5 mcg, 2 puffs twice daily. He uses albuterol approximately once per week, primarily before playing soccer, which effectively prevents exertional dyspnea. He notes that if he does not use albuterol preemptively, he develops shortness of breath with exertion. He denies current wheezing, chest tightness, or shortness of breath at rest. He has not required prednisone since the last visit and denies any interval emergency room visits, urgent care visits, or other medical encounters for asthma exacerbations since his last appointment in January 2025.      Patient Active Problem List   Diagnosis    Injury of head, subsequent encounter    Gastroesophageal reflux disease without esophagitis    Moderate persistent asthma    Encounter for vasectomy counseling    Elevated hemoglobin A1c       Past Medical History:   Diagnosis Date    Gastroesophageal reflux disease without esophagitis 2/16/2023      Problem (# of Occurrences) Relation (Name,Age of Onset)    Asthma (1) Brother    Other Cancer (2) Maternal Grandmother: brain tumor, Maternal Grandfather: brain tumor           Negative family history of: Diabetes, Hypertension, Prostate Cancer, Colon Cancer, Breast Cancer          Past Surgical History:   Procedure Laterality Date    COLONOSCOPY N/A 03/03/2021    Procedure: Colonoscopy, With Polypectomy And Biopsy;  Surgeon: Josemanuel Alexander MD;  Location: [facility] OR    COLONOSCOPY WITH CO2 INSUFFLATION N/A 03/03/2021    Procedure: COLONOSCOPY, WITH CO2 INSUFFLATION;  Surgeon: Josemanuel Alexander MD;  Location: [facility] OR    COMBINED ESOPHAGOSCOPY, GASTROSCOPY, DUODENOSCOPY (EGD) WITH CO2 INSUFFLATION N/A 03/03/2021    Procedure: ESOPHAGOGASTRODUODENOSCOPY, WITH CO2 INSUFFLATION;  Surgeon: Josemanuel Alexander MD;  Location: [facility] OR    ESOPHAGOSCOPY, GASTROSCOPY, DUODENOSCOPY (EGD), COMBINED N/A 03/03/2021    Procedure: Esophagogastroduodenoscopy, With  Biopsy;  Surgeon: Josemanuel Alexander MD;  Location: [facility] OR    NO HISTORY OF SURGERY      SINUS SURGERY  11/2024     Social History     Socioeconomic History    Marital status:      Spouse name: None    Number of children: 0    Years of education: None    Highest education level: None   Occupational History    Occupation: [employer]   Tobacco Use    Smoking status: Never     Passive exposure: Never    Smokeless tobacco: Never   Vaping Use    Vaping status: Never Used   Substance and Sexual Activity    Alcohol use: Yes     Comment: 1 beer a day 5-7 drinks per week    Drug use: No    Sexual activity: Yes     Partners: Female   Other Topics Concern    Parent/sibling w/ CABG, MI or angioplasty before 65F 55M? No   Social History Narrative    05/29/25        ENVIRONMENTAL HISTORY: [place] The home is heated with a forced air. They does have central air conditioning. The patient's bedroom is furnished with feather/wool bedding or pillows, hard jazzmine in bedroom, and fabric window coverings.  Pets inside the house include 1 dog(s). There no history of cockroach or mice infestation. There is/are 0 smokers in the house.  The house does have a damp basement.      Social Drivers of Health     Financial Resource Strain: Low Risk  (3/13/2025)    Financial Resource Strain     Within the past 12 months, have you or your family members you live with been unable to get utilities (heat, electricity) when it was really needed?: No   Food Insecurity: Low Risk  (3/13/2025)    Food Insecurity     Within the past 12 months, did you worry that your food would run out before you got money to buy more?: No     Within the past 12 months, did the food you bought just not last and you didn t have money to get more?: No   Transportation Needs: Low Risk  (3/13/2025)    Transportation Needs     Within the past 12 months, has lack of transportation kept you from medical appointments, getting  your medicines, non-medical meetings or appointments, work, or from getting things that you need?: No   Physical Activity: Sufficiently Active (3/13/2025)    Exercise Vital Sign     Days of Exercise per Week: 5 days     Minutes of Exercise per Session: 30 min   Stress: No Stress Concern Present (3/13/2025)    [place] [employer] - Occupational Stress Questionnaire     Feeling of Stress : Only a little   Social Connections: Unknown (3/13/2025)    Social Connection and Isolation Panel [NHANES]     Frequency of Social Gatherings with Friends and Family: Twice a week   Interpersonal Safety: Unknown (3/13/2025)    Interpersonal Safety     Do you feel physically and emotionally safe where you currently live?: Patient declined     Within the past 12 months, have you been hit, slapped, kicked or otherwise physically hurt by someone?: Patient declined     Within the past 12 months, have you been humiliated or emotionally abused in other ways by your partner or ex-partner?: Patient declined   Housing Stability: High Risk (3/13/2025)    Housing Stability     Do you have housing? : No     Are you worried about losing your housing?: No             Current Outpatient Medications:     albuterol (PROAIR HFA/PROVENTIL HFA/VENTOLIN HFA) 108 (90 Base) MCG/ACT inhaler, Inhale 2 puffs into the lungs every 6 hours as needed for shortness of breath, wheezing or cough, Disp: 36 g, Rfl: 3    azelastine (ASTELIN) 0.1 % nasal spray, Spray 2 sprays into both nostrils 2 times daily as needed for rhinitis, Disp: 30 mL, Rfl: 3    budesonide-formoterol (SYMBICORT) 80-4.5 MCG/ACT inhaler, Inhale 2 puffs into the lungs 2 times daily., Disp: 10.2 g, Rfl: 3    gabapentin (NEURONTIN) 100 MG capsule, Take 1-3 capsules (100-300 mg) by mouth nightly as needed for other (RLS)., Disp: 90 capsule, Rfl: 0    budesonide (PULMICORT) 0.5 MG/2ML neb solution, Mix respule of 0.5mg/2 mL budesonide vial in 8oz normal saline sinus rinse bottle.  Irrigate each nostril with half of the bottle twice daily (Patient not taking: Reported on 5/29/2025), Disp: 360 mL, Rfl: 0    DEEP SEA NASAL SPRAY 0.65 % nasal spray, Spray 1 spray in nostril daily as needed for congestion. (Patient not taking: Reported on 5/29/2025), Disp: , Rfl:   Immunization History   Administered Date(s) Administered    COVID-19 12+ (Pfizer) 10/05/2023, 12/13/2024    COVID-19 Bivalent 18+ (Moderna) 10/27/2022    COVID-19 Monovalent 18+ (Moderna) 04/06/2021, 05/04/2021, 11/15/2021    DTAP (<7y) 1993, 01/11/1994, 04/26/1994, 08/10/1995    Flu, Unspecified 09/16/2021    HEPA 09/01/2012    HIB (PRP-T) 1993, 01/11/1994, 04/26/1994, 10/31/1994    HIB, Unspecified 1993, 01/11/1994, 04/26/1994, 10/31/1994    HepB 08/22/2006, 09/01/2012    Hepatitis A (VAQTA)(ADULT 19+) 09/01/2012, 10/03/2018    Hepatitis B, Adult (Energix-B/Recombivax HB) 09/01/2012, 10/03/2018    Hepatitis B, Peds (Engerix-B/Recombivax HB) 08/22/2006    Influenza (intradermal) 09/04/2012    Influenza Vaccine >6 months,quad, PF 10/10/2018, 02/05/2021    Influenza Vaccine, 6+MO IM (QUADRIVALENT W/PRESERVATIVES) 09/04/2012    Influenza, Split Virus, Trivalent, Pf (Fluzone\Fluarix) 11/01/2024    Influenza,INJ,MDCK,PF,Quad >6mo(Flucelvax) 10/25/2022, 10/05/2023    Japanese Encephalitis IM 10/03/2018, 10/10/2018    MMR (MMRII) 10/31/1994, 08/22/2006    Meningococcal (Menomune ) 08/31/2009    Meningococcal ACWY (Menactra ) 08/31/2009    Pneumococcal 23 valent 05/09/2024    Polio, Unspecified 1993, 01/11/1994, 08/10/1995    Poliovirus, inactivated (IPV) 1993, 01/11/1994, 08/10/1995, 09/01/2012    Rabies Vaccine (Imovax) 10/03/2018, 10/10/2018, 10/24/2018    TD,PF 7+ (Tenivac) 08/22/2006, 09/01/2012    TDAP Vaccine (Adacel) 08/22/2006, 09/01/2012, 10/24/2018    Typhoid IM 09/01/2012, 10/10/2018    Typhoid-h-p 09/01/2012    Yellow Fever 09/01/2012     No Known Allergies  OBJECTIVE:                                   "                               /76 (BP Location: Left arm, Patient Position: Sitting, Cuff Size: Adult Regular)   Pulse 55   Temp 97.1  F (36.2  C) (Tympanic)   Ht 1.9 m (6' 2.8\")   Wt 95.8 kg (211 lb 3.2 oz)   SpO2 98%   BMI 26.54 kg/m          Physical Exam  Vitals and nursing note reviewed.   Constitutional:       General: He is not in acute distress.     Appearance: He is not diaphoretic.   HENT:      Head: Normocephalic and atraumatic.      Right Ear: Tympanic membrane, ear canal and external ear normal.      Left Ear: Tympanic membrane, ear canal and external ear normal.      Nose: No mucosal edema or rhinorrhea.      Right Turbinates: Not enlarged.      Left Turbinates: Not enlarged.      Mouth/Throat:      Lips: Pink.      Mouth: Mucous membranes are moist.      Pharynx: Oropharynx is clear. No pharyngeal swelling, oropharyngeal exudate or posterior oropharyngeal erythema.   Eyes:      General:         Right eye: No discharge.         Left eye: No discharge.      Conjunctiva/sclera: Conjunctivae normal.   Cardiovascular:      Rate and Rhythm: Normal rate and regular rhythm.      Heart sounds: Normal heart sounds. No murmur heard.  Pulmonary:      Effort: Pulmonary effort is normal. No respiratory distress.      Breath sounds: Normal breath sounds and air entry. No stridor, decreased air movement or transmitted upper airway sounds. No decreased breath sounds, wheezing, rhonchi or rales.   Musculoskeletal:         General: Normal range of motion.   Neurological:      Mental Status: He is alert and oriented to person, place, and time.   Psychiatric:         Mood and Affect: Mood normal.         Behavior: Behavior normal.         WORKUP:     ACT: 22       My interpretation:The office spirometry performed today doesn't suggest an obstruction.     ASSESSMENT/PLAN:    Moderate persistent asthma without complication  Well-controlled with Symbicort 160/4.5 mcg 2 puffs twice daily and albuterol as " needed.  - Stepdown to Symbicort 80/4.5 mcg 2 puffs twice daily.  - Continue using albuterol as needed and pre-exertionally.  - Will reassess in 3 months.    - General PFT Lab (Please always keep checked)  - Spirometry, Breathing Capacity  - budesonide-formoterol (SYMBICORT) 80-4.5 MCG/ACT inhaler  Dispense: 10.2 g; Refill: 3      Chronic pansinusitis  Status post sinus surgery.  - Well-controlled with intranasal fluticasone.  - Continue without changes.         Follow up in 3 months or sooner if needed.    Thank you for allowing us to participate in the care of this patient. Please feel free to contact us if there are any questions or concerns about the patient.    Disclaimer: This note consists of symbols derived from keyboarding, dictation and/or voice recognition software. As a result, there may be errors in the script that have gone undetected. Please consider this when interpreting information found in this chart.    Consent was obtained from the patient to use an AI documentation tool in the creation of this note.     Paramjit Valdivia MD, FAAAAI, FACAAI  Allergy, Asthma and Immunology     [facility]

## 2025-05-29 NOTE — LETTER
5/29/2025      Raj Smith  [address] [facility] [address] [facility] [address]      Dear Colleague,    Thank you for referring your patient, Raj Smith, to the [facility]. Please see a copy of my visit note below.    SUBJECTIVE:                                                                   Raj Smith presents today to our Allergy Clinic at [facility] for a follow up visit. He is a 29 year old male with a history of asthma and chronic rhinosinusitis.    In his late 20s, the patient started having episodes of dyspnea, wheezing, and sometimes cough.  Triggered by exertion, dust exposure, smoke, cold air, and viral respiratory infections.  It was a perennial pattern of symptoms.  Possibly, in winter, he felt worse.  In December 2023, pulmonary function test with mild obstruction.  Postbronchodilator response was noted.  Started Qvar 80 mcg which remarkably improved his symptoms.  He still could not exercise properly without using albuterol.  He still would experience nocturnal awakening due to dyspnea and wheezing about once a week.  The patient was complaining of a daily heartburn.  In May 2024, spirometry suggested mild obstruction.     The patient also mentioned long-term history of nasal congestion, rhinorrhea, sneezing, and poor sense of smell.  When I saw the patient in May 2024, I was unable to differentiate between nasal polyposis versus polypoid degeneration.     In May 2024, CBC with differential within normal limits.  Absolute eosinophil count 300.  Total serum IgE within normal limits.  Serum IgE for the regional aeroallergen panel with a slight sensitivity to tree and weed pollen.     The patient recently saw Dr. Alvarez.  Dr. Alvarez send me a note on June 20, 2024 sinus CT showed significant CRS without nasal polyps.  He had a sinus surgery done in November 2024.    The patient reports no current issues with his sinuses. He uses  intranasal fluticasone consistently on a daily basis and does not require oral antihistamines. He frequently uses a Neti pot, particularly during upper respiratory infections. He denies uncontrolled nasal congestion, rhinorrhea, or sneezing.    Regarding asthma, the patient feels well-controlled on Symbicort 160/4.5 mcg, 2 puffs twice daily. He uses albuterol approximately once per week, primarily before playing soccer, which effectively prevents exertional dyspnea. He notes that if he does not use albuterol preemptively, he develops shortness of breath with exertion. He denies current wheezing, chest tightness, or shortness of breath at rest. He has not required prednisone since the last visit and denies any interval emergency room visits, urgent care visits, or other medical encounters for asthma exacerbations since his last appointment in January 2025.      Patient Active Problem List   Diagnosis     Injury of head, subsequent encounter     Gastroesophageal reflux disease without esophagitis     Moderate persistent asthma     Encounter for vasectomy counseling     Elevated hemoglobin A1c       Past Medical History:   Diagnosis Date     Gastroesophageal reflux disease without esophagitis 2/16/2023      Problem (# of Occurrences) Relation (Name,Age of Onset)    Asthma (1) Brother    Other Cancer (2) Maternal Grandmother: brain tumor, Maternal Grandfather: brain tumor           Negative family history of: Diabetes, Hypertension, Prostate Cancer, Colon Cancer, Breast Cancer          Past Surgical History:   Procedure Laterality Date     COLONOSCOPY N/A 03/03/2021    Procedure: Colonoscopy, With Polypectomy And Biopsy;  Surgeon: Josemanuel Alexander MD;  Location: [facility] OR     COLONOSCOPY WITH CO2 INSUFFLATION N/A 03/03/2021    Procedure: COLONOSCOPY, WITH CO2 INSUFFLATION;  Surgeon: Josemanuel Alexander MD;  Location: [facility] OR     COMBINED ESOPHAGOSCOPY, GASTROSCOPY, DUODENOSCOPY (EGD) WITH CO2 INSUFFLATION  N/A 03/03/2021    Procedure: ESOPHAGOGASTRODUODENOSCOPY, WITH CO2 INSUFFLATION;  Surgeon: Josemanuel Alexander MD;  Location: [facility] OR     ESOPHAGOSCOPY, GASTROSCOPY, DUODENOSCOPY (EGD), COMBINED N/A 03/03/2021    Procedure: Esophagogastroduodenoscopy, With Biopsy;  Surgeon: Josemanuel Alexander MD;  Location: [facility] OR     NO HISTORY OF SURGERY       SINUS SURGERY  11/2024     Social History     Socioeconomic History     Marital status:      Spouse name: None     Number of children: 0     Years of education: None     Highest education level: None   Occupational History     Occupation: [employer]   Tobacco Use     Smoking status: Never     Passive exposure: Never     Smokeless tobacco: Never   Vaping Use     Vaping status: Never Used   Substance and Sexual Activity     Alcohol use: Yes     Comment: 1 beer a day 5-7 drinks per week     Drug use: No     Sexual activity: Yes     Partners: Female   Other Topics Concern     Parent/sibling w/ CABG, MI or angioplasty before 65F 55M? No   Social History Narrative    05/29/25        ENVIRONMENTAL HISTORY: [place] The home is heated with a forced air. They does have central air conditioning. The patient's bedroom is furnished with feather/wool bedding or pillows, hard jazzmine in bedroom, and fabric window coverings.  Pets inside the house include 1 dog(s). There no history of cockroach or mice infestation. There is/are 0 smokers in the house.  The house does have a damp basement.      Social Drivers of Health     Financial Resource Strain: Low Risk  (3/13/2025)    Financial Resource Strain      Within the past 12 months, have you or your family members you live with been unable to get utilities (heat, electricity) when it was really needed?: No   Food Insecurity: Low Risk  (3/13/2025)    Food Insecurity      Within the past 12 months, did you worry that your food would run out before you got money to buy more?: No       Within the past 12 months, did the food you bought just not last and you didn t have money to get more?: No   Transportation Needs: Low Risk  (3/13/2025)    Transportation Needs      Within the past 12 months, has lack of transportation kept you from medical appointments, getting your medicines, non-medical meetings or appointments, work, or from getting things that you need?: No   Physical Activity: Sufficiently Active (3/13/2025)    Exercise Vital Sign      Days of Exercise per Week: 5 days      Minutes of Exercise per Session: 30 min   Stress: No Stress Concern Present (3/13/2025)    [place] [employer] - Occupational Stress Questionnaire      Feeling of Stress : Only a little   Social Connections: Unknown (3/13/2025)    Social Connection and Isolation Panel [NHANES]      Frequency of Social Gatherings with Friends and Family: Twice a week   Interpersonal Safety: Unknown (3/13/2025)    Interpersonal Safety      Do you feel physically and emotionally safe where you currently live?: Patient declined      Within the past 12 months, have you been hit, slapped, kicked or otherwise physically hurt by someone?: Patient declined      Within the past 12 months, have you been humiliated or emotionally abused in other ways by your partner or ex-partner?: Patient declined   Housing Stability: High Risk (3/13/2025)    Housing Stability      Do you have housing? : No      Are you worried about losing your housing?: No             Current Outpatient Medications:      albuterol (PROAIR HFA/PROVENTIL HFA/VENTOLIN HFA) 108 (90 Base) MCG/ACT inhaler, Inhale 2 puffs into the lungs every 6 hours as needed for shortness of breath, wheezing or cough, Disp: 36 g, Rfl: 3     azelastine (ASTELIN) 0.1 % nasal spray, Spray 2 sprays into both nostrils 2 times daily as needed for rhinitis, Disp: 30 mL, Rfl: 3     budesonide-formoterol (SYMBICORT) 80-4.5 MCG/ACT inhaler, Inhale 2 puffs into the lungs 2 times daily., Disp:  10.2 g, Rfl: 3     gabapentin (NEURONTIN) 100 MG capsule, Take 1-3 capsules (100-300 mg) by mouth nightly as needed for other (RLS)., Disp: 90 capsule, Rfl: 0     budesonide (PULMICORT) 0.5 MG/2ML neb solution, Mix respule of 0.5mg/2 mL budesonide vial in 8oz normal saline sinus rinse bottle. Irrigate each nostril with half of the bottle twice daily (Patient not taking: Reported on 5/29/2025), Disp: 360 mL, Rfl: 0     DEEP SEA NASAL SPRAY 0.65 % nasal spray, Spray 1 spray in nostril daily as needed for congestion. (Patient not taking: Reported on 5/29/2025), Disp: , Rfl:   Immunization History   Administered Date(s) Administered     COVID-19 12+ (Pfizer) 10/05/2023, 12/13/2024     COVID-19 Bivalent 18+ (Moderna) 10/27/2022     COVID-19 Monovalent 18+ (Moderna) 04/06/2021, 05/04/2021, 11/15/2021     DTAP (<7y) 1993, 01/11/1994, 04/26/1994, 08/10/1995     Flu, Unspecified 09/16/2021     HEPA 09/01/2012     HIB (PRP-T) 1993, 01/11/1994, 04/26/1994, 10/31/1994     HIB, Unspecified 1993, 01/11/1994, 04/26/1994, 10/31/1994     HepB 08/22/2006, 09/01/2012     Hepatitis A (VAQTA)(ADULT 19+) 09/01/2012, 10/03/2018     Hepatitis B, Adult (Energix-B/Recombivax HB) 09/01/2012, 10/03/2018     Hepatitis B, Peds (Engerix-B/Recombivax HB) 08/22/2006     Influenza (intradermal) 09/04/2012     Influenza Vaccine >6 months,quad, PF 10/10/2018, 02/05/2021     Influenza Vaccine, 6+MO IM (QUADRIVALENT W/PRESERVATIVES) 09/04/2012     Influenza, Split Virus, Trivalent, Pf (Fluzone\Fluarix) 11/01/2024     Influenza,INJ,MDCK,PF,Quad >6mo(Flucelvax) 10/25/2022, 10/05/2023     Japanese Encephalitis IM 10/03/2018, 10/10/2018     MMR (MMRII) 10/31/1994, 08/22/2006     Meningococcal (Menomune ) 08/31/2009     Meningococcal ACWY (Menactra ) 08/31/2009     Pneumococcal 23 valent 05/09/2024     Polio, Unspecified 1993, 01/11/1994, 08/10/1995     Poliovirus, inactivated (IPV) 1993, 01/11/1994, 08/10/1995, 09/01/2012      "Rabies Vaccine (Imovax) 10/03/2018, 10/10/2018, 10/24/2018     TD,PF 7+ (Tenivac) 08/22/2006, 09/01/2012     TDAP Vaccine (Adacel) 08/22/2006, 09/01/2012, 10/24/2018     Typhoid IM 09/01/2012, 10/10/2018     Typhoid-h-p 09/01/2012     Yellow Fever 09/01/2012     No Known Allergies  OBJECTIVE:                                                                 /76 (BP Location: Left arm, Patient Position: Sitting, Cuff Size: Adult Regular)   Pulse 55   Temp 97.1  F (36.2  C) (Tympanic)   Ht 1.9 m (6' 2.8\")   Wt 95.8 kg (211 lb 3.2 oz)   SpO2 98%   BMI 26.54 kg/m          Physical Exam  Vitals and nursing note reviewed.   Constitutional:       General: He is not in acute distress.     Appearance: He is not diaphoretic.   HENT:      Head: Normocephalic and atraumatic.      Right Ear: Tympanic membrane, ear canal and external ear normal.      Left Ear: Tympanic membrane, ear canal and external ear normal.      Nose: No mucosal edema or rhinorrhea.      Right Turbinates: Not enlarged.      Left Turbinates: Not enlarged.      Mouth/Throat:      Lips: Pink.      Mouth: Mucous membranes are moist.      Pharynx: Oropharynx is clear. No pharyngeal swelling, oropharyngeal exudate or posterior oropharyngeal erythema.   Eyes:      General:         Right eye: No discharge.         Left eye: No discharge.      Conjunctiva/sclera: Conjunctivae normal.   Cardiovascular:      Rate and Rhythm: Normal rate and regular rhythm.      Heart sounds: Normal heart sounds. No murmur heard.  Pulmonary:      Effort: Pulmonary effort is normal. No respiratory distress.      Breath sounds: Normal breath sounds and air entry. No stridor, decreased air movement or transmitted upper airway sounds. No decreased breath sounds, wheezing, rhonchi or rales.   Musculoskeletal:         General: Normal range of motion.   Neurological:      Mental Status: He is alert and oriented to person, place, and time.   Psychiatric:         Mood and Affect: Mood " normal.         Behavior: Behavior normal.         WORKUP:     ACT: 22       My interpretation:The office spirometry performed today doesn't suggest an obstruction.     ASSESSMENT/PLAN:    Moderate persistent asthma without complication  Well-controlled with Symbicort 160/4.5 mcg 2 puffs twice daily and albuterol as needed.  - Stepdown to Symbicort 80/4.5 mcg 2 puffs twice daily.  - Continue using albuterol as needed and pre-exertionally.  - Will reassess in 3 months.    - General PFT Lab (Please always keep checked)  - Spirometry, Breathing Capacity  - budesonide-formoterol (SYMBICORT) 80-4.5 MCG/ACT inhaler  Dispense: 10.2 g; Refill: 3      Chronic pansinusitis  Status post sinus surgery.  - Well-controlled with intranasal fluticasone.  - Continue without changes.         Follow up in 3 months or sooner if needed.    Thank you for allowing us to participate in the care of this patient. Please feel free to contact us if there are any questions or concerns about the patient.    Disclaimer: This note consists of symbols derived from keyboarding, dictation and/or voice recognition software. As a result, there may be errors in the script that have gone undetected. Please consider this when interpreting information found in this chart.    Consent was obtained from the patient to use an AI documentation tool in the creation of this note.     Paramjit Valdivia MD, FAAAAI, FACAAI  Allergy, Asthma and Immunology     [facility]      Again, thank you for allowing me to participate in the care of your patient.        Sincerely,        Paramjit Valdivia MD    Electronically signed

## 2025-05-29 NOTE — PATIENT INSTRUCTIONS
Prescription Assistance  If you need assistance with your prescriptions (cost, coverage, etc) please contact: [facility] [phone]           If labs have been ordered/completed, please allow 7-14 business days for final interpretation of results to be sent on My Chart, phone or mail. Some lab results can take up to 28 days for results.         Allergy Staff Appt Hours Shot Hours Locations    Physician      Paramjit Valdivia MD         Support Staff      Senait Mosqueda MA     Tuesday:   [place] :  [place]: :         :  [place] 7-3     [place]        Tuesday: : : :10        Tuesday: :10        Thursday: 7-3:10     [facility]       Tues & Wed: :10       Thurs: 12:10       Fri:             [facility]  [address] [place] [address]  Appt Line: [phone]        [facility]  [address] [place] [address]  Appt Line: [phone]     Pulmonary Function Scheduling:  [place]: [phone]  [place]: [phone]  [phone]

## 2025-07-29 DIAGNOSIS — J45.40 MODERATE PERSISTENT ASTHMA WITHOUT COMPLICATION: ICD-10-CM

## 2025-07-29 RX ORDER — BUDESONIDE AND FORMOTEROL FUMARATE DIHYDRATE 80; 4.5 UG/1; UG/1
2 AEROSOL RESPIRATORY (INHALATION) 2 TIMES DAILY
Qty: 30.6 G | Refills: 0 | Status: SHIPPED | OUTPATIENT
Start: 2025-07-29

## 2025-07-29 NOTE — TELEPHONE ENCOUNTER
Prescription approved per [facility] Refill Protocol.    Senait PRABHAKAR RN  Specialty/Allergy Clinics

## 2025-07-29 NOTE — PROGRESS NOTES
Chief Complaint   Patient presents with    Consult     Post sinus surgery November 2024   Chronic pansinusitis     History of Present Illness  Raj Smith is a 30 year old male who presents today for evaluation.  I saw the patient previously at my private practice in 2024.  Patient has a history of chronic sinusitis without nasal polyposis in the setting of moderate persistent asthma.  He sees Dr. Valdivia for allergy management.  The patient underwent bilateral endoscopic maxillary antrostomies, bilateral endoscopic total ethmoidectomies, bilateral endoscopic sphenoidotomies, bilateral endoscopic frontal sinusotomies, right middle turbinate darryl bullosa, septoplasty, and bilateral inferior turbinate reduction on 11/11/2024.  He presents today for follow-up.    From a symptom standpoint, the patient reports that has been doing well symptomatically.  He is breathing well through his nose.  He had an episode of relatively significant illness for a few months over wintertime/early spring requiring antibiotic treatment.  Since that time, his sinuses have been stable.  He denies any significant rhinorrhea or postnasal drainage.  No taste or smell changes.  No face pain/pressure/fullness.  He is doing Flonase once daily.  He has been following up with Dr. Valdivia for management of his asthma.  They have been coming down on his Symbicort.  He is not needing to use his albuterol inhaler at all.  He is only rinsing as needed.  He is otherwise doing well and has no ENT related concerns.    Past Medical History  Patient Active Problem List   Diagnosis    Injury of head, subsequent encounter    Gastroesophageal reflux disease without esophagitis    Moderate persistent asthma    Encounter for vasectomy counseling    Elevated hemoglobin A1c     Current Medications    Current Outpatient Medications:     budesonide-formoterol (SYMBICORT) 80-4.5 MCG/ACT inhaler, Inhale 2 puffs into the lungs 2 times daily. ALL FURTHER  REFILLS AT FOLLOW UP APPOINTMENT, Disp: 30.6 g, Rfl: 0    budesonide-formoterol (SYMBICORT/BREYNA) 80-4.5 MCG/ACT inhaler, Inhale 2 puffs into the lungs 2 times daily., Disp: , Rfl:     fluticasone (FLONASE) 50 MCG/ACT nasal spray, Spray 1 spray into both nostrils daily., Disp: , Rfl:     gabapentin (NEURONTIN) 100 MG capsule, Take 1-3 capsules (100-300 mg) by mouth nightly as needed for other (RLS)., Disp: 90 capsule, Rfl: 0    albuterol (PROAIR HFA/PROVENTIL HFA/VENTOLIN HFA) 108 (90 Base) MCG/ACT inhaler, Inhale 2 puffs into the lungs every 6 hours as needed for shortness of breath, wheezing or cough (Patient not taking: Reported on 7/30/2025), Disp: 36 g, Rfl: 3    Allergies  No Known Allergies    Social History  Social History     Socioeconomic History    Marital status:     Number of children: 0   Occupational History    Occupation: [employer]   Tobacco Use    Smoking status: Never     Passive exposure: Never    Smokeless tobacco: Never   Vaping Use    Vaping status: Never Used   Substance and Sexual Activity    Alcohol use: Yes     Comment: 1 beer a day 5-7 drinks per week    Drug use: No    Sexual activity: Yes     Partners: Female   Other Topics Concern    Parent/sibling w/ CABG, MI or angioplasty before 65F 55M? No   Social History Narrative    05/29/25        ENVIRONMENTAL HISTORY: [place] The home is heated with a forced air. They does have central air conditioning. The patient's bedroom is furnished with feather/wool bedding or pillows, hard jazzmine in bedroom, and fabric window coverings.  Pets inside the house include 1 dog(s). There no history of cockroach or mice infestation. There is/are 0 smokers in the house.  The house does have a damp basement.      Social Drivers of Health     Financial Resource Strain: Low Risk  (3/13/2025)    Financial Resource Strain     Within the past 12 months, have you or your family members you live with been unable to  get utilities (heat, electricity) when it was really needed?: No   Food Insecurity: Low Risk  (3/13/2025)    Food Insecurity     Within the past 12 months, did you worry that your food would run out before you got money to buy more?: No     Within the past 12 months, did the food you bought just not last and you didn t have money to get more?: No   Transportation Needs: Low Risk  (3/13/2025)    Transportation Needs     Within the past 12 months, has lack of transportation kept you from medical appointments, getting your medicines, non-medical meetings or appointments, work, or from getting things that you need?: No   Physical Activity: Sufficiently Active (3/13/2025)    Exercise Vital Sign     Days of Exercise per Week: 5 days     Minutes of Exercise per Session: 30 min   Stress: No Stress Concern Present (3/13/2025)    [employer] [place] [employer] - Occupational Stress Questionnaire     Feeling of Stress : Only a little   Social Connections: Unknown (3/13/2025)    Social Connection and Isolation Panel [NHANES]     Frequency of Social Gatherings with Friends and Family: Twice a week   Interpersonal Safety: Unknown (3/13/2025)    Interpersonal Safety     Do you feel physically and emotionally safe where you currently live?: Patient declined     Within the past 12 months, have you been hit, slapped, kicked or otherwise physically hurt by someone?: Patient declined     Within the past 12 months, have you been humiliated or emotionally abused in other ways by your partner or ex-partner?: Patient declined   Housing Stability: High Risk (3/13/2025)    Housing Stability     Do you have housing? : No     Are you worried about losing your housing?: No       Family History  Family History   Problem Relation Age of Onset    Asthma Brother     Other Cancer Maternal Grandmother         brain tumor    Other Cancer Maternal Grandfather         brain tumor    Diabetes No family hx of     Hypertension No family hx of      Prostate Cancer No family hx of     Colon Cancer No family hx of     Breast Cancer No family hx of        Review of Systems  As per HPI and PMHx, otherwise 10 system review including the head and neck, constitutional, eyes, respiratory, GI, skin, neurologic, lymphatic, endocrine, and allergy systems is negative.    Physical Exam  /70   Pulse 64   Resp 16   SpO2 98%   GENERAL: The patient is a pleasant, cooperative 30 year old male in no acute distress.  HEAD: Normocephalic, atraumatic. Hair and scalp are normal.  EYES: Pupils are equal, round, reactive to light and accommodation. Extraocular movements are intact. The sclera nonicteric without injection. The extraocular structures are normal.  EARS: Normal shape and symmetry. No tenderness when palpating the mastoid or tragal areas bilaterally. No mastoid erythema or fluctuance.    NOSE: Nares are patent.  Nasal mucosa is slightly dry.  Nasal septum is midline.  The inferior turbinates are moderately hypertrophied.  No nasal cavity masses, polyps, or mucopurulence on anterior rhinoscopy.  NEUROLOGIC: Cranial nerves II through XII are grossly intact. Voice is strong. Patient is House-Brackmann I/VI bilaterally.  CARDIOVASCULAR: Extremities are warm and well-perfused. No significant peripheral edema.  RESPIRATORY: Patient has nonlabored breathing without cough, wheeze, stridor.  PSYCHIATRIC: Patient is alert and oriented. Mood and affect appear normal.  SKIN: Warm and dry. No scalp, face, or neck lesions noted.    Procedure: Flexible Laryngoscopy  Indication: Follow-up endoscopic sinus surgery, history of chronic sinusitis and recurrent acute sinusitis    To best visualize the upper airway anatomy and due to the chief complaint and HPI, I proceeded with flexible fiberoptic laryngoscopy examination. The bilateral nasal cavities were anesthetized and decongested. The bilateral nasal cavities were examined using a flexible fiberoptic laryngoscope.  The right  nasal cavity shows a patent max antrostomy and ethmoid cavity.  The sphenoethmoid recess is clear.  The frontal sinus outflow area is clear.  The left nasal cavity shows a patent maxillary antrostomy and ethmoid cavity.  The sphenoethmoid recess is clear.  The frontal sinus outflow area is clear.  The nasal mucosa was slightly boggy and inflamed with sticky, inflammatory mucus.  There were no nasal cavity masses, polyps, or mucopurulence bilaterally. The nasal septum is midline. The nasopharynx had a normal appearance with normal Eustachian tube openings and fossa of Rosenmuller bilaterally.  Minimal adenoid tissue. The base of tongue, vallecula, epiglottis, aryepiglottic folds, arytenoids, and piriform sinuses were without mass or lesion. The bilateral true vocal folds were symmetrically mobile without nodules or masses. The visualized portions of the infraglottic and subglottic airway are unremarkable. The scope was removed. The patient tolerated the procedure well.    Assessment and Plan     ICD-10-CM    1. Chronic pansinusitis  J32.4 NASAL ENDOSCOPY, DIAGNOSTIC      2. Status post functional endoscopic sinus surgery  Z98.890 NASAL ENDOSCOPY, DIAGNOSTIC      3. Status post nasal septoplasty  Z98.890 NASAL ENDOSCOPY, DIAGNOSTIC      4. Moderate persistent asthma, unspecified whether complicated  J45.40 NASAL ENDOSCOPY, DIAGNOSTIC         It was my pleasure seeing Raj Smith today in clinic.  Overall the patient is doing well after his endoscopic sinus surgery.  His sinus openings appear healthy and he has no signs of significant acute or chronic inflammation.  We discussed using Flonase as needed daily.  We discussed using nasal irrigation as needed in the future.  If he does have an episode of illness, he can use Afrin for 3 days or 6 doses just prior to nasal irrigation to help clear infection.  If he is not improving, he can contact me and we will consider antibiotic treatment at that time.     At this  point in time, I would recommend observation.  The patient can return to clinic in the future as needed with any problems or concerns.    Pete Alvarez MD  Department of Otolaryngology-Head and Neck Surgery  [facility]

## 2025-07-30 ENCOUNTER — OFFICE VISIT (OUTPATIENT)
Dept: OTOLARYNGOLOGY | Facility: CLINIC | Age: 30
End: 2025-07-30
Payer: COMMERCIAL

## 2025-07-30 VITALS
SYSTOLIC BLOOD PRESSURE: 100 MMHG | OXYGEN SATURATION: 98 % | RESPIRATION RATE: 16 BRPM | HEART RATE: 64 BPM | DIASTOLIC BLOOD PRESSURE: 70 MMHG

## 2025-07-30 DIAGNOSIS — Z98.890 STATUS POST FUNCTIONAL ENDOSCOPIC SINUS SURGERY: ICD-10-CM

## 2025-07-30 DIAGNOSIS — Z98.890 STATUS POST NASAL SEPTOPLASTY: ICD-10-CM

## 2025-07-30 DIAGNOSIS — J45.40 MODERATE PERSISTENT ASTHMA, UNSPECIFIED WHETHER COMPLICATED: ICD-10-CM

## 2025-07-30 DIAGNOSIS — J32.4 CHRONIC PANSINUSITIS: Primary | ICD-10-CM

## 2025-07-30 RX ORDER — FLUTICASONE PROPIONATE 50 MCG
1 SPRAY, SUSPENSION (ML) NASAL DAILY
COMMUNITY

## 2025-07-30 RX ORDER — BUDESONIDE AND FORMOTEROL FUMARATE DIHYDRATE 80; 4.5 UG/1; UG/1
2 AEROSOL RESPIRATORY (INHALATION) 2 TIMES DAILY
COMMUNITY

## 2025-07-30 NOTE — PATIENT INSTRUCTIONS
NASAL SALINE IRRIGATION INSTRUCTIONS    You will be starting nasal saline irrigations and will need to obtain the following:      - NeilMed Sinus Rinse 8 oz Kit  - Distilled or filtered water   - Normal saline salt packets    Place filtered or distilled water into the NeilMed bottle up to the fill line (DO NOT USE TAP OR WELL WATER). Place the pre-made salt packet in the 8 oz of saline. Shake the bottle to suspend into solution. Lean head forward over a sink or a basin. Rinse each side of the nose with one-half of the bottle (each squeeze is about one-half of the bottle). Rinse the nose daily as needed.     If you use topical nasal sprays, apply following irrigation.    SICK SINUS PLAN  If you feel like you are getting a sinus infection, obtain oxymetazoline (Afrin) nasal spray. Oxymetazoline (Afrin) is available over the counter. Place 2 sprays in each nostril. Perform nasal saline irrigation in each nostril 20-30 minutes following application of the oxymetazoline spray. You can use this medication for up to 3 days or 6 doses. If your sinus symptoms are persistent, please contact your ENT surgeon.    Video example: https://www.[employer].com/watch?[email or web address]

## 2025-07-30 NOTE — LETTER
7/30/2025      Raj Smith  [address] [id number]      Dear Colleague,    Thank you for referring your patient, Raj Smith, to the [facility]. Please see a copy of my visit note below.    Chief Complaint   Patient presents with     Consult     Post sinus surgery November 2024   Chronic pansinusitis     History of Present Illness  Raj Smith is a 30 year old male who presents today for evaluation.  I saw the patient previously at my private practice in 2024.  Patient has a history of chronic sinusitis without nasal polyposis in the setting of moderate persistent asthma.  He sees Dr. Valdivia for allergy management.  The patient underwent bilateral endoscopic maxillary antrostomies, bilateral endoscopic total ethmoidectomies, bilateral endoscopic sphenoidotomies, bilateral endoscopic frontal sinusotomies, right middle turbinate darryl bullosa, septoplasty, and bilateral inferior turbinate reduction on 11/11/2024.  He presents today for follow-up.    From a symptom standpoint, the patient reports that has been doing well symptomatically.  He is breathing well through his nose.  He had an episode of relatively significant illness for a few months over wintertime/early spring requiring antibiotic treatment.  Since that time, his sinuses have been stable.  He denies any significant rhinorrhea or postnasal drainage.  No taste or smell changes.  No face pain/pressure/fullness.  He is doing Flonase once daily.  He has been following up with Dr. Valdivia for management of his asthma.  They have been coming down on his Symbicort.  He is not needing to use his albuterol inhaler at all.  He is only rinsing as needed.  He is otherwise doing well and has no ENT related concerns.    Past Medical History  Patient Active Problem List   Diagnosis     Injury of head, subsequent encounter     Gastroesophageal reflux disease without esophagitis     Moderate persistent  asthma     Encounter for vasectomy counseling     Elevated hemoglobin A1c     Current Medications    Current Outpatient Medications:      budesonide-formoterol (SYMBICORT) 80-4.5 MCG/ACT inhaler, Inhale 2 puffs into the lungs 2 times daily. ALL FURTHER REFILLS AT FOLLOW UP APPOINTMENT, Disp: 30.6 g, Rfl: 0     budesonide-formoterol (SYMBICORT/BREYNA) 80-4.5 MCG/ACT inhaler, Inhale 2 puffs into the lungs 2 times daily., Disp: , Rfl:      fluticasone (FLONASE) 50 MCG/ACT nasal spray, Spray 1 spray into both nostrils daily., Disp: , Rfl:      gabapentin (NEURONTIN) 100 MG capsule, Take 1-3 capsules (100-300 mg) by mouth nightly as needed for other (RLS)., Disp: 90 capsule, Rfl: 0     albuterol (PROAIR HFA/PROVENTIL HFA/VENTOLIN HFA) 108 (90 Base) MCG/ACT inhaler, Inhale 2 puffs into the lungs every 6 hours as needed for shortness of breath, wheezing or cough (Patient not taking: Reported on 7/30/2025), Disp: 36 g, Rfl: 3    Allergies  No Known Allergies    Social History  Social History     Socioeconomic History     Marital status:      Number of children: 0   Occupational History     Occupation: [employer]   Tobacco Use     Smoking status: Never     Passive exposure: Never     Smokeless tobacco: Never   Vaping Use     Vaping status: Never Used   Substance and Sexual Activity     Alcohol use: Yes     Comment: 1 beer a day 5-7 drinks per week     Drug use: No     Sexual activity: Yes     Partners: Female   Other Topics Concern     Parent/sibling w/ CABG, MI or angioplasty before 65F 55M? No   Social History Narrative    05/29/25        ENVIRONMENTAL HISTORY: [place] The home is heated with a forced air. They does have central air conditioning. The patient's bedroom is furnished with feather/wool bedding or pillows, hard jazzmine in bedroom, and fabric window coverings.  Pets inside the house include 1 dog(s). There no history of cockroach or mice infestation. There is/are  0 smokers in the house.  The house does have a damp basement.      Social Drivers of Health     Financial Resource Strain: Low Risk  (3/13/2025)    Financial Resource Strain      Within the past 12 months, have you or your family members you live with been unable to get utilities (heat, electricity) when it was really needed?: No   Food Insecurity: Low Risk  (3/13/2025)    Food Insecurity      Within the past 12 months, did you worry that your food would run out before you got money to buy more?: No      Within the past 12 months, did the food you bought just not last and you didn t have money to get more?: No   Transportation Needs: Low Risk  (3/13/2025)    Transportation Needs      Within the past 12 months, has lack of transportation kept you from medical appointments, getting your medicines, non-medical meetings or appointments, work, or from getting things that you need?: No   Physical Activity: Sufficiently Active (3/13/2025)    Exercise Vital Sign      Days of Exercise per Week: 5 days      Minutes of Exercise per Session: 30 min   Stress: No Stress Concern Present (3/13/2025)    [employer] [place] [employer] - Occupational Stress Questionnaire      Feeling of Stress : Only a little   Social Connections: Unknown (3/13/2025)    Social Connection and Isolation Panel [NHANES]      Frequency of Social Gatherings with Friends and Family: Twice a week   Interpersonal Safety: Unknown (3/13/2025)    Interpersonal Safety      Do you feel physically and emotionally safe where you currently live?: Patient declined      Within the past 12 months, have you been hit, slapped, kicked or otherwise physically hurt by someone?: Patient declined      Within the past 12 months, have you been humiliated or emotionally abused in other ways by your partner or ex-partner?: Patient declined   Housing Stability: High Risk (3/13/2025)    Housing Stability      Do you have housing? : No      Are you worried about losing your  housing?: No       Family History  Family History   Problem Relation Age of Onset     Asthma Brother      Other Cancer Maternal Grandmother         brain tumor     Other Cancer Maternal Grandfather         brain tumor     Diabetes No family hx of      Hypertension No family hx of      Prostate Cancer No family hx of      Colon Cancer No family hx of      Breast Cancer No family hx of        Review of Systems  As per HPI and PMHx, otherwise 10 system review including the head and neck, constitutional, eyes, respiratory, GI, skin, neurologic, lymphatic, endocrine, and allergy systems is negative.    Physical Exam  /70   Pulse 64   Resp 16   SpO2 98%   GENERAL: The patient is a pleasant, cooperative 30 year old male in no acute distress.  HEAD: Normocephalic, atraumatic. Hair and scalp are normal.  EYES: Pupils are equal, round, reactive to light and accommodation. Extraocular movements are intact. The sclera nonicteric without injection. The extraocular structures are normal.  EARS: Normal shape and symmetry. No tenderness when palpating the mastoid or tragal areas bilaterally. No mastoid erythema or fluctuance.    NOSE: Nares are patent.  Nasal mucosa is slightly dry.  Nasal septum is midline.  The inferior turbinates are moderately hypertrophied.  No nasal cavity masses, polyps, or mucopurulence on anterior rhinoscopy.  NEUROLOGIC: Cranial nerves II through XII are grossly intact. Voice is strong. Patient is House-Brackmann I/VI bilaterally.  CARDIOVASCULAR: Extremities are warm and well-perfused. No significant peripheral edema.  RESPIRATORY: Patient has nonlabored breathing without cough, wheeze, stridor.  PSYCHIATRIC: Patient is alert and oriented. Mood and affect appear normal.  SKIN: Warm and dry. No scalp, face, or neck lesions noted.    Procedure: Flexible Laryngoscopy  Indication: Follow-up endoscopic sinus surgery, history of chronic sinusitis and recurrent acute sinusitis    To best visualize the  upper airway anatomy and due to the chief complaint and HPI, I proceeded with flexible fiberoptic laryngoscopy examination. The bilateral nasal cavities were anesthetized and decongested. The bilateral nasal cavities were examined using a flexible fiberoptic laryngoscope.  The right nasal cavity shows a patent max antrostomy and ethmoid cavity.  The sphenoethmoid recess is clear.  The frontal sinus outflow area is clear.  The left nasal cavity shows a patent maxillary antrostomy and ethmoid cavity.  The sphenoethmoid recess is clear.  The frontal sinus outflow area is clear.  The nasal mucosa was slightly boggy and inflamed with sticky, inflammatory mucus.  There were no nasal cavity masses, polyps, or mucopurulence bilaterally. The nasal septum is midline. The nasopharynx had a normal appearance with normal Eustachian tube openings and fossa of Rosenmuller bilaterally.  Minimal adenoid tissue. The base of tongue, vallecula, epiglottis, aryepiglottic folds, arytenoids, and piriform sinuses were without mass or lesion. The bilateral true vocal folds were symmetrically mobile without nodules or masses. The visualized portions of the infraglottic and subglottic airway are unremarkable. The scope was removed. The patient tolerated the procedure well.    Assessment and Plan     ICD-10-CM    1. Chronic pansinusitis  J32.4 NASAL ENDOSCOPY, DIAGNOSTIC      2. Status post functional endoscopic sinus surgery  Z98.890 NASAL ENDOSCOPY, DIAGNOSTIC      3. Status post nasal septoplasty  Z98.890 NASAL ENDOSCOPY, DIAGNOSTIC      4. Moderate persistent asthma, unspecified whether complicated  J45.40 NASAL ENDOSCOPY, DIAGNOSTIC         It was my pleasure seeing Raj Smith today in clinic.  Overall the patient is doing well after his endoscopic sinus surgery.  His sinus openings appear healthy and he has no signs of significant acute or chronic inflammation.  We discussed using Flonase as needed daily.  We discussed using  nasal irrigation as needed in the future.  If he does have an episode of illness, he can use Afrin for 3 days or 6 doses just prior to nasal irrigation to help clear infection.  If he is not improving, he can contact me and we will consider antibiotic treatment at that time.     At this point in time, I would recommend observation.  The patient can return to clinic in the future as needed with any problems or concerns.    Pete Alvarez MD  Department of Otolaryngology-Head and Neck Surgery  [facility]     Again, thank you for allowing me to participate in the care of your patient.        Sincerely,        Pete Alvarez MD    Electronically signed

## (undated) DEVICE — PAD CHUX UNDERPAD 23X24" 7136

## (undated) DEVICE — KIT ENDO FIRST STEP DISINFECTANT 200ML W/POUCH EP-4

## (undated) DEVICE — PREP CHLORAPREP 26ML TINTED ORANGE  260815

## (undated) RX ORDER — FENTANYL CITRATE 50 UG/ML
INJECTION, SOLUTION INTRAMUSCULAR; INTRAVENOUS
Status: DISPENSED
Start: 2021-03-03